# Patient Record
Sex: FEMALE | Race: WHITE | NOT HISPANIC OR LATINO | Employment: OTHER | URBAN - METROPOLITAN AREA
[De-identification: names, ages, dates, MRNs, and addresses within clinical notes are randomized per-mention and may not be internally consistent; named-entity substitution may affect disease eponyms.]

---

## 2017-04-12 ENCOUNTER — OFFICE VISIT (OUTPATIENT)
Dept: OBSTETRICS AND GYNECOLOGY | Facility: CLINIC | Age: 37
End: 2017-04-12
Payer: COMMERCIAL

## 2017-04-12 VITALS
DIASTOLIC BLOOD PRESSURE: 88 MMHG | SYSTOLIC BLOOD PRESSURE: 126 MMHG | WEIGHT: 128.31 LBS | BODY MASS INDEX: 22.73 KG/M2

## 2017-04-12 DIAGNOSIS — Z12.4 CERVICAL CANCER SCREENING: Primary | ICD-10-CM

## 2017-04-12 DIAGNOSIS — Z30.41 ENCOUNTER FOR SURVEILLANCE OF CONTRACEPTIVE PILLS: ICD-10-CM

## 2017-04-12 PROCEDURE — 99999 PR PBB SHADOW E&M-EST. PATIENT-LVL III: CPT | Mod: PBBFAC,,, | Performed by: OBSTETRICS & GYNECOLOGY

## 2017-04-12 PROCEDURE — 88175 CYTOPATH C/V AUTO FLUID REDO: CPT

## 2017-04-12 PROCEDURE — 99395 PREV VISIT EST AGE 18-39: CPT | Mod: S$GLB,,, | Performed by: OBSTETRICS & GYNECOLOGY

## 2017-04-12 RX ORDER — NORETHINDRONE ACETATE AND ETHINYL ESTRADIOL 1.5-30(21)
1 KIT ORAL DAILY
Qty: 28 TABLET | Refills: 12 | Status: SHIPPED | OUTPATIENT
Start: 2017-04-12 | End: 2018-05-09 | Stop reason: SDUPTHER

## 2017-04-12 NOTE — PROGRESS NOTES
Chief Complaint   Patient presents with    Well Woman    Medication Refill     OCP       History and Physical:  Patient's last menstrual period was 2017.       Kimberly Pearce is a 36 y.o.  female who presents today for her routine annual GYN exam. The patient has no Gynecology complaints today. No bowel or bladder complaints. Doing well on oral contraceptive pills , but planning on vasectomy- counseled. Recent breast implant removal.       Allergies: Review of patient's allergies indicates:  No Known Allergies    Past Medical History:   Diagnosis Date    Abnormal Pap smear     Abnormal Pap smear of vagina     Scoliosis     Uterine fibroid        Past Surgical History:   Procedure Laterality Date    Breast Lift      BREAST SURGERY       SECTION      COSMETIC SURGERY  2011    breast augmentation       MEDS:   Current Outpatient Prescriptions on File Prior to Visit   Medication Sig Dispense Refill    multivitamin capsule Take 1 capsule by mouth once daily.      norethindrone-ethinyl estradiol-iron (MICROGESTIN FE1.5/30) 1.5 mg-30 mcg (21)/75 mg (7) tablet Take 1 tablet by mouth once daily. 28 tablet 12     No current facility-administered medications on file prior to visit.        OB History      Para Term  AB TAB SAB Ectopic Multiple Living    1 1                  Social History     Social History    Marital status:      Spouse name: N/A    Number of children: N/A    Years of education: N/A     Occupational History    Not on file.     Social History Main Topics    Smoking status: Never Smoker    Smokeless tobacco: Not on file    Alcohol use 2.0 oz/week     4 Standard drinks or equivalent per week    Drug use: No    Sexual activity: Yes     Partners: Male     Birth control/ protection: OCP     Other Topics Concern    Not on file     Social History Narrative       Family History   Problem Relation Age of Onset    Diabetes Maternal Grandmother      Breast cancer Neg Hx     Ovarian cancer Neg Hx          Past medical and surgical history reviewed.   I have reviewed the patient's medical history in detail and updated the computerized patient record.        Review of System:   General: no chills, fever, night sweats, weight gain or weight loss  Psychological: no depression or suicidal ideation  Breasts: no new or changing breast lumps, nipple discharge or masses.  Respiratory: no cough, shortness of breath, or wheezing  Cardiovascular: no chest pain or dyspnea on exertion  Gastrointestinal: no abdominal pain, change in bowel habits, or black or bloody stools  Genito-Urinary: no incontinence, urinary frequency/urgency or vulvar/vaginal symptoms, pelvic pain or abnormal vaginal bleeding.  Musculoskeletal: no gait disturbance or muscular weakness      Physical Exam:   /88  Wt 58.2 kg (128 lb 4.9 oz)  LMP 03/22/2017  BMI 22.73 kg/m2  Constitutional: She is oriented to person, place, and time. She appears well-developed and well-nourished. No distress.   HENT:   Head: Normocephalic and atraumatic.   Eyes: Conjunctivae and EOM are normal. No scleral icterus.   Neck: Normal range of motion. Neck supple. No tracheal deviation present.   Cardiovascular: Normal rate.    Pulmonary/Chest: Effort normal. No respiratory distress. She exhibits no tenderness.  Breasts: are symmetrical. Healing scars, palpation deferred.   Abdominal: Soft. She exhibits no distension and no mass. There is no tenderness. There is no rebound and no guarding.   Genitourinary:    External rectal exam shows no thrombosed external hemorrhoids.    Pelvic exam was performed with patient supine.   No labial fusion.   There is no rash, lesion or injury on the right labia.   There is no rash, lesion or injury on the left labia.   No bleeding and no signs of injury around the vaginal introitus, urethra is without lesions and well supported. The cervix is visualized with no discharge, lesions or  friability.   No vaginal discharge found.    No significant Cystocele, Enterocele or rectocele, and uterus well supported.   Bimanual exam:   The urethra is normal to palpation and there are no palpable vaginal wall masses.   Uterus is not deviated, not enlarged, not fixed, normal shape and not tender.   Cervix exhibits no motion tenderness.    Right adnexum displays no mass and no tenderness.   Left adnexum displays no mass and no tenderness.  Musculoskeletal: Normal range of motion.   Lymphadenopathy: No inguinal adenopathy present.   Neurological: She is alert and oriented to person, place, and time. Coordination normal.   Skin: Skin is warm and dry. She is not diaphoretic.   Psychiatric: She has a normal mood and affect.      Assessment:   Normal annual GYN exam  1. Cervical cancer screening  Liquid-based pap smear, screening       Plan:   PAP  Follow up in 1 year.

## 2017-04-12 NOTE — MR AVS SNAPSHOT
[facility]  [address] [place] [id number]  Phone: [phone]                  Kimberly Pearce   2017 11:20 AM   Office Visit    Description:  Female : 1980   Provider:  Kristian Barnett MD   Department:  [facility]           Reason for Visit     Well Woman     Medication Refill           Diagnoses this Visit        Comments    Cervical cancer screening    -  Primary            To Do List           Goals (5 Years of Data)     None      [facility]     [facility] Care [facility] -  Assistance  Unless otherwise directed by your provider, please contact [facility], our nurse care line that is available for  assistance.     Registered nurses in the [facility] provide: appointment scheduling, clinical advisement, health education, and other advisory services.  Call: [phone] (toll free)               Medications           Message regarding Medications     Verify the changes and/or additions to your medication regime listed below are the same as discussed with your clinician today.  If any of these changes or additions are incorrect, please notify your healthcare provider.             Verify that the below list of medications is an accurate representation of the medications you are currently taking.  If none reported, the list may be blank. If incorrect, please contact your healthcare provider. Carry this list with you in case of emergency.           Current Medications     multivitamin capsule Take 1 capsule by mouth once daily.    norethindrone-ethinyl estradiol-iron (MICROGESTIN FE1.5/30) 1.5 mg-30 mcg (21)/75 mg (7) tablet Take 1 tablet by mouth once daily.           Clinical Reference Information           Your Vitals Were     BP Weight Last Period BMI       126/88 58.2 kg (128 lb 4.9 oz) 2017 22.73 kg/m2       Blood Pressure          Most Recent Value    BP  126/88      Allergies as of 2017     No Known  Allergies      Immunizations Administered on Date of Encounter - 4/12/2017     None      Orders Placed During Today's Visit      Normal Orders This Visit    Liquid-based pap smear, screening       Language Assistance Services     ATTENTION: Language assistance services are available, free of charge. Please call [phone].      ATENCIÓN: Si habneto mims, tiene a cruz disposición servicios gratuitos de asistencia lingüística. Llame al [phone].     CHÚ Ý: N?u b?n nói Ti?ng Vi?t, có các d?ch v? h? tr? ngôn ng? mi?n phí dành cho b?n. G?i s? [phone].         [facility] complies with applicable Federal civil rights laws and does not discriminate on the basis of race, color, national origin, age, disability, or sex.

## 2018-05-09 ENCOUNTER — OFFICE VISIT (OUTPATIENT)
Dept: OBSTETRICS AND GYNECOLOGY | Facility: CLINIC | Age: 38
End: 2018-05-09
Payer: COMMERCIAL

## 2018-05-09 VITALS
BODY MASS INDEX: 22.73 KG/M2 | HEIGHT: 63 IN | SYSTOLIC BLOOD PRESSURE: 126 MMHG | DIASTOLIC BLOOD PRESSURE: 84 MMHG | WEIGHT: 128.31 LBS

## 2018-05-09 DIAGNOSIS — Z30.41 ENCOUNTER FOR SURVEILLANCE OF CONTRACEPTIVE PILLS: ICD-10-CM

## 2018-05-09 DIAGNOSIS — Z12.4 PAP SMEAR FOR CERVICAL CANCER SCREENING: Primary | ICD-10-CM

## 2018-05-09 PROCEDURE — 99999 PR PBB SHADOW E&M-EST. PATIENT-LVL III: CPT | Mod: PBBFAC,,, | Performed by: OBSTETRICS & GYNECOLOGY

## 2018-05-09 PROCEDURE — 87624 HPV HI-RISK TYP POOLED RSLT: CPT

## 2018-05-09 PROCEDURE — 88175 CYTOPATH C/V AUTO FLUID REDO: CPT

## 2018-05-09 PROCEDURE — 99395 PREV VISIT EST AGE 18-39: CPT | Mod: S$GLB,,, | Performed by: OBSTETRICS & GYNECOLOGY

## 2018-05-09 RX ORDER — NORETHINDRONE ACETATE AND ETHINYL ESTRADIOL 1.5-30(21)
1 KIT ORAL DAILY
Qty: 90 TABLET | Refills: 3 | Status: SHIPPED | OUTPATIENT
Start: 2018-05-09 | End: 2019-03-28 | Stop reason: SDUPTHER

## 2018-05-09 RX ORDER — DAPSONE 75 MG/G
GEL TOPICAL DAILY
COMMUNITY
Start: 2018-04-24

## 2018-05-09 RX ORDER — SPIRONOLACTONE 50 MG/1
TABLET, FILM COATED ORAL
Refills: 0 | COMMUNITY
Start: 2018-04-24 | End: 2020-10-07 | Stop reason: SDUPTHER

## 2018-05-09 NOTE — PROGRESS NOTES
Chief Complaint   Patient presents with    Annual Exam       History and Physical:  Patient's last menstrual period was 2018 (approximate).       Kimberly Pearce is a 37 y.o.  female who presents today for her routine annual GYN exam. The patient has no Gynecology complaints today. No bowel or bladder complaints.       Allergies: Review of patient's allergies indicates:  No Known Allergies    Past Medical History:   Diagnosis Date    Abnormal Pap smear     Abnormal Pap smear of vagina     Scoliosis     Uterine fibroid        Past Surgical History:   Procedure Laterality Date    Breast Lift      BREAST SURGERY       SECTION      COSMETIC SURGERY  2011    breast augmentation       MEDS:   Current Outpatient Prescriptions on File Prior to Visit   Medication Sig Dispense Refill    multivitamin capsule Take 1 capsule by mouth once daily.      ondansetron (ZOFRAN-ODT) 4 MG TbDL Take 1 tablet (4 mg total) by mouth every 6 (six) hours as needed. 30 tablet 0    [DISCONTINUED] norethindrone-ethinyl estradiol-iron (MICROGESTIN FE1.5/30) 1.5 mg-30 mcg (21)/75 mg (7) tablet Take 1 tablet by mouth once daily. 28 tablet 12     No current facility-administered medications on file prior to visit.        OB History      Para Term  AB Living    1 1            SAB TAB Ectopic Multiple Live Births                       Social History     Social History    Marital status:      Spouse name: N/A    Number of children: N/A    Years of education: N/A     Occupational History    Not on file.     Social History Main Topics    Smoking status: Never Smoker    Smokeless tobacco: Never Used    Alcohol use 2.0 oz/week     4 Standard drinks or equivalent per week    Drug use: No    Sexual activity: Yes     Partners: Male     Birth control/ protection: OCP     Other Topics Concern    Not on file     Social History Narrative    No narrative on file       Family History   Problem  "Relation Age of Onset    Diabetes Maternal Grandmother     Breast cancer Neg Hx     Ovarian cancer Neg Hx          Past medical and surgical history reviewed.   I have reviewed the patient's medical history in detail and updated the computerized patient record.        Review of System:   General: no chills, fever, night sweats, weight gain or weight loss  Psychological: no depression or suicidal ideation  Breasts: no new or changing breast lumps, nipple discharge or masses.  Respiratory: no cough, shortness of breath, or wheezing  Cardiovascular: no chest pain or dyspnea on exertion  Gastrointestinal: no abdominal pain, change in bowel habits, or black or bloody stools  Genito-Urinary: no incontinence, urinary frequency/urgency or vulvar/vaginal symptoms, pelvic pain or abnormal vaginal bleeding.  Musculoskeletal: no gait disturbance or muscular weakness      Physical Exam:   /84   Ht 5' 3" (1.6 m)   Wt 58.2 kg (128 lb 4.9 oz)   LMP 04/18/2018 (Approximate)   BMI 22.73 kg/m²   Constitutional: She is oriented to person, place, and time. She appears well-developed and well-nourished. No distress.   HENT:   Head: Normocephalic and atraumatic.   Eyes: Conjunctivae and EOM are normal. No scleral icterus.   Neck: Normal range of motion. Neck supple. No tracheal deviation present.   Cardiovascular: Normal rate.    Pulmonary/Chest: Effort normal. No respiratory distress. She exhibits no tenderness.  Breasts: are symmetrical. Well healed scars   Right breast exhibits no inverted nipple, no mass, no nipple discharge, no skin change and no tenderness.   Left breast exhibits no inverted nipple, no mass, no nipple discharge, no skin change and no tenderness.  Abdominal: Soft. She exhibits no distension and no mass. There is no tenderness. There is no rebound and no guarding.   Genitourinary:    External rectal exam shows no thrombosed external hemorrhoids.    Pelvic exam was performed with patient supine.   No " labial fusion.   There is no rash, lesion or injury on the right labia.   There is no rash, lesion or injury on the left labia.   No bleeding and no signs of injury around the vaginal introitus, urethra is without lesions and well supported. The cervix is visualized with no discharge, lesions or friability.   No vaginal discharge found.   No significant Cystocele, Enterocele or rectocele, and uterus well supported.   Bimanual exam:   The urethra is normal to palpation and there are no palpable vaginal wall masses.   Uterus is not deviated, not enlarged, not fixed, normal shape and not tender.   Cervix exhibits no motion tenderness.    Right adnexum displays no mass and no tenderness.   Left adnexum displays no mass and no tenderness.  Musculoskeletal: Normal range of motion.   Lymphadenopathy: No inguinal adenopathy present.   Neurological: She is alert and oriented to person, place, and time. Coordination normal.   Skin: Skin is warm and dry. She is not diaphoretic.   Psychiatric: She has a normal mood and affect.      Assessment:   Normal annual GYN exam  1. Pap smear for cervical cancer screening  Liquid-based pap smear, screening    HPV High Risk Genotypes, PCR   2. Encounter for surveillance of contraceptive pills  norethindrone-ethinyl estradiol-iron (MICROGESTIN FE1.5/30) 1.5 mg-30 mcg (21)/75 mg (7) tablet       Plan:   PAP  Mammogram at 39  Refill oral contraceptive pills   Follow up in 1 year.  Patient informed will be contacted with results within 2 weeks. Encouraged to please call back or email if she has not heard from us by then.

## 2018-05-12 DIAGNOSIS — Z30.41 ENCOUNTER FOR SURVEILLANCE OF CONTRACEPTIVE PILLS: ICD-10-CM

## 2018-05-14 LAB
HPV16 AG SPEC QL: NEGATIVE
HPV16+18+H RISK 12 DNA CVX-IMP: NEGATIVE
HPV18 DNA SPEC QL NAA+PROBE: NEGATIVE

## 2018-05-14 RX ORDER — NORETHINDRONE ACETATE AND ETHINYL ESTRADIOL AND FERROUS FUMARATE 1.5-30(21)
KIT ORAL
Qty: 28 TABLET | Refills: 12 | Status: SHIPPED | OUTPATIENT
Start: 2018-05-14 | End: 2019-05-14 | Stop reason: SDUPTHER

## 2019-03-28 DIAGNOSIS — Z30.41 ENCOUNTER FOR SURVEILLANCE OF CONTRACEPTIVE PILLS: ICD-10-CM

## 2019-03-28 RX ORDER — NORETHINDRONE ACETATE AND ETHINYL ESTRADIOL 1.5-30(21)
KIT ORAL
Qty: 84 TABLET | Refills: 0 | Status: SHIPPED | OUTPATIENT
Start: 2019-03-28 | End: 2019-05-14

## 2019-05-14 ENCOUNTER — OFFICE VISIT (OUTPATIENT)
Dept: OBSTETRICS AND GYNECOLOGY | Facility: CLINIC | Age: 39
End: 2019-05-14
Payer: COMMERCIAL

## 2019-05-14 VITALS
SYSTOLIC BLOOD PRESSURE: 122 MMHG | WEIGHT: 126.56 LBS | HEIGHT: 63 IN | DIASTOLIC BLOOD PRESSURE: 76 MMHG | BODY MASS INDEX: 22.43 KG/M2

## 2019-05-14 DIAGNOSIS — Z30.41 ENCOUNTER FOR SURVEILLANCE OF CONTRACEPTIVE PILLS: ICD-10-CM

## 2019-05-14 DIAGNOSIS — Z12.4 PAP SMEAR FOR CERVICAL CANCER SCREENING: Primary | ICD-10-CM

## 2019-05-14 PROCEDURE — 88175 CYTOPATH C/V AUTO FLUID REDO: CPT

## 2019-05-14 PROCEDURE — 99395 PREV VISIT EST AGE 18-39: CPT | Mod: S$GLB,,, | Performed by: OBSTETRICS & GYNECOLOGY

## 2019-05-14 PROCEDURE — 99999 PR PBB SHADOW E&M-EST. PATIENT-LVL III: ICD-10-PCS | Mod: PBBFAC,,, | Performed by: OBSTETRICS & GYNECOLOGY

## 2019-05-14 PROCEDURE — 87624 HPV HI-RISK TYP POOLED RSLT: CPT

## 2019-05-14 PROCEDURE — 99999 PR PBB SHADOW E&M-EST. PATIENT-LVL III: CPT | Mod: PBBFAC,,, | Performed by: OBSTETRICS & GYNECOLOGY

## 2019-05-14 PROCEDURE — 99395 PR PREVENTIVE VISIT,EST,18-39: ICD-10-PCS | Mod: S$GLB,,, | Performed by: OBSTETRICS & GYNECOLOGY

## 2019-05-14 RX ORDER — NORETHINDRONE ACETATE AND ETHINYL ESTRADIOL 1.5-30(21)
1 KIT ORAL DAILY
Qty: 90 TABLET | Refills: 3 | Status: SHIPPED | OUTPATIENT
Start: 2019-05-14 | End: 2020-05-12 | Stop reason: SDUPTHER

## 2019-05-14 NOTE — PROGRESS NOTES
Chief Complaint   Patient presents with    Well Woman       History and Physical:  Patient's last menstrual period was 2019.       Kimberly Pearce is a 38 y.o.  female who presents today for her routine annual GYN exam. The patient has no Gynecology complaints today. Mammogram due next year, counseled.       Allergies: Review of patient's allergies indicates:  No Known Allergies    Past Medical History:   Diagnosis Date    Abnormal Pap smear     Abnormal Pap smear of vagina     Scoliosis     Uterine fibroid        Past Surgical History:   Procedure Laterality Date    Breast Lift      BREAST SURGERY       SECTION      COSMETIC SURGERY  2011    breast augmentation       MEDS:   Current Outpatient Medications on File Prior to Visit   Medication Sig Dispense Refill    ACZONE 7.5 % GlwP       MICROGESTIN FE 1.5/30, 28, 1.5 mg-30 mcg (21)/75 mg (7) tablet TAKE 1 TABLET BY MOUTH EVERY DAY 28 tablet 12    multivitamin capsule Take 1 capsule by mouth once daily.      spironolactone (ALDACTONE) 50 MG tablet TK ONE T PO D  0    [DISCONTINUED] BLISOVI FE 1.5/30, 28, 1.5 mg-30 mcg (21)/75 mg (7) tablet TAKE 1 TABLET BY MOUTH EVERY DAY 84 tablet 0    [DISCONTINUED] ondansetron (ZOFRAN-ODT) 4 MG TbDL Take 1 tablet (4 mg total) by mouth every 6 (six) hours as needed. 30 tablet 0     No current facility-administered medications on file prior to visit.        OB History        1    Para   1    Term                AB        Living           SAB        TAB        Ectopic        Multiple        Live Births                     Social History     Socioeconomic History    Marital status:      Spouse name: Not on file    Number of children: Not on file    Years of education: Not on file    Highest education level: Not on file   Occupational History    Not on file   Social Needs    Financial resource strain: Not on file    Food insecurity:     Worry: Not on file      "Inability: Not on file    Transportation needs:     Medical: Not on file     Non-medical: Not on file   Tobacco Use    Smoking status: Never Smoker    Smokeless tobacco: Never Used   Substance and Sexual Activity    Alcohol use: Yes     Alcohol/week: 2.0 oz     Types: 4 Standard drinks or equivalent per week    Drug use: No    Sexual activity: Yes     Partners: Male     Birth control/protection: OCP   Lifestyle    Physical activity:     Days per week: Not on file     Minutes per session: Not on file    Stress: Not on file   Relationships    Social connections:     Talks on phone: Not on file     Gets together: Not on file     Attends [employer] service: Not on file     Active member of club or organization: Not on file     Attends meetings of clubs or organizations: Not on file     Relationship status: Not on file   Other Topics Concern    Not on file   Social History Narrative    Not on file       Family History   Problem Relation Age of Onset    Diabetes Maternal Grandmother     Breast cancer Neg Hx     Ovarian cancer Neg Hx          Past medical and surgical history reviewed.   I have reviewed the patient's medical history in detail and updated the computerized patient record.        Review of System:   General: no chills, fever, night sweats, weight gain or weight loss  Psychological: no depression or suicidal ideation  Breasts: no new or changing breast lumps, nipple discharge or masses.  Respiratory: no cough, shortness of breath, or wheezing  Cardiovascular: no chest pain or dyspnea on exertion  Gastrointestinal: no abdominal pain, change in bowel habits, or black or bloody stools  Genito-Urinary: no incontinence, urinary frequency/urgency or vulvar/vaginal symptoms, pelvic pain or abnormal vaginal bleeding.  Musculoskeletal: no gait disturbance or muscular weakness      Physical Exam:   /76   Ht 5' 3" (1.6 m)   Wt 57.4 kg (126 lb 8.7 oz)   LMP 04/30/2019   BMI 22.42 kg/m² "   Constitutional: She appears alert and responsive. She appears well-developed, well-groomed, and well-nourished. No distress. Normal Weight   HENT:   Head: Normocephalic and atraumatic.   Eyes: Conjunctivae and EOM are normal. No scleral icterus.   Neck: Symmetrical. Normal range of motion. Neck supple. No tracheal deviation present. THYROID: without masses or tenderness.  Cardiovascular: Normal rate, no rhythm abnormality noted. Extremities without swelling or edema, warm.    Pulmonary/Chest: Normal respiratory Effort. No distress or retractions. She exhibits no tenderness.  Breasts: are symmetrical. Well healed reduction / lift scars   Right breast exhibits no inverted nipple, no mass, no nipple discharge, no skin change and no tenderness.   Left breast exhibits no inverted nipple, no mass, no nipple discharge, no skin change and no tenderness.  Abdominal: Soft. She exhibits no distension, hernias or masses. There is no tenderness. No enlargement of liver edge or spleen.  There is no rebound and no guarding.   Genitourinary:    External rectal exam shows no thrombosed external hemorrhoids, no lesions.     Pelvic exam was performed with patient supine.   No labial fusion, and symmetrical.    There is no rash, lesion or injury on the right labia.   There is no rash, lesion or injury on the left labia.   No bleeding and no signs of injury around the vaginal introitus, urethral meatus is normal size and without prolapse or lesions, urethra well supported. The cervix is visualized with no discharge, lesions or friability.   No vaginal discharge found.   No significant Cystocele, Enterocele or rectocele, and cervix and uterus well supported.   Bimanual exam:   The urethra is normal to palpation and there are no palpable vaginal wall masses.   Uterus is not deviated, not enlarged, not fixed, normal shape, retroverted, and not tender.   Cervix exhibits no motion tenderness.    Right adnexum displays no mass or nodularity  and no tenderness.   Left adnexum displays no mass or nodularity and no tenderness.  Musculoskeletal: Normal range of motion.   Lymphadenopathy: No inguinal adenopathy present.   Neurological: She is alert and oriented to person, place, and time. Coordination normal.   Skin: Skin is warm and dry. She is not diaphoretic. No rashes, lesions or ulcers.   Psychiatric: She has a normal mood and affect, oriented to person, place, and time.      Assessment:   Normal annual GYN exam  1. Pap smear for cervical cancer screening  Liquid-based pap smear, screening    HPV High Risk Genotypes, PCR       Plan:   PAP  Mammogram next year  Follow up in 1 year.  Patient informed will be contacted with results within 2 weeks. Encouraged to please call back or email if she has not heard from us by then.

## 2019-05-15 ENCOUNTER — PATIENT MESSAGE (OUTPATIENT)
Dept: OBSTETRICS AND GYNECOLOGY | Facility: CLINIC | Age: 39
End: 2019-05-15

## 2019-05-17 LAB
HPV HR 12 DNA CVX QL NAA+PROBE: NEGATIVE
HPV16 AG SPEC QL: NEGATIVE
HPV18 DNA SPEC QL NAA+PROBE: NEGATIVE

## 2020-05-12 DIAGNOSIS — Z30.41 ENCOUNTER FOR SURVEILLANCE OF CONTRACEPTIVE PILLS: ICD-10-CM

## 2020-05-12 RX ORDER — NORETHINDRONE ACETATE AND ETHINYL ESTRADIOL 1.5-30(21)
1 KIT ORAL DAILY
Qty: 30 TABLET | Refills: 0 | Status: SHIPPED | OUTPATIENT
Start: 2020-05-12 | End: 2020-06-04 | Stop reason: SDUPTHER

## 2020-06-04 ENCOUNTER — OFFICE VISIT (OUTPATIENT)
Dept: OBSTETRICS AND GYNECOLOGY | Facility: CLINIC | Age: 40
End: 2020-06-04
Payer: COMMERCIAL

## 2020-06-04 ENCOUNTER — HOSPITAL ENCOUNTER (OUTPATIENT)
Dept: RADIOLOGY | Facility: HOSPITAL | Age: 40
Discharge: HOME | End: 2020-06-04
Attending: OBSTETRICS & GYNECOLOGY
Payer: COMMERCIAL

## 2020-06-04 VITALS
WEIGHT: 131.38 LBS | DIASTOLIC BLOOD PRESSURE: 90 MMHG | RESPIRATION RATE: 20 BRPM | SYSTOLIC BLOOD PRESSURE: 130 MMHG | BODY MASS INDEX: 23.28 KG/M2

## 2020-06-04 DIAGNOSIS — Z12.31 VISIT FOR SCREENING MAMMOGRAM: ICD-10-CM

## 2020-06-04 DIAGNOSIS — Z30.430 ENCOUNTER FOR INSERTION OF INTRAUTERINE CONTRACEPTIVE DEVICE (IUD): ICD-10-CM

## 2020-06-04 DIAGNOSIS — Z30.41 ENCOUNTER FOR SURVEILLANCE OF CONTRACEPTIVE PILLS: ICD-10-CM

## 2020-06-04 DIAGNOSIS — Z01.419 CERVICAL SMEAR, AS PART OF ROUTINE GYNECOLOGICAL EXAMINATION: Primary | ICD-10-CM

## 2020-06-04 PROCEDURE — 88175 CYTOPATH C/V AUTO FLUID REDO: CPT

## 2020-06-04 PROCEDURE — 77063 BREAST TOMOSYNTHESIS BI: CPT | Mod: 26,,, | Performed by: RADIOLOGY

## 2020-06-04 PROCEDURE — 77067 SCR MAMMO BI INCL CAD: CPT | Mod: TC,PN

## 2020-06-04 PROCEDURE — 77067 SCR MAMMO BI INCL CAD: CPT | Mod: 26,,, | Performed by: RADIOLOGY

## 2020-06-04 PROCEDURE — 99999 PR PBB SHADOW E&M-EST. PATIENT-LVL III: CPT | Mod: PBBFAC,,, | Performed by: OBSTETRICS & GYNECOLOGY

## 2020-06-04 PROCEDURE — 99396 PREV VISIT EST AGE 40-64: CPT | Mod: S$GLB,,, | Performed by: OBSTETRICS & GYNECOLOGY

## 2020-06-04 PROCEDURE — 77063 MAMMO DIGITAL SCREENING BILAT WITH TOMOSYNTHESIS_CAD: ICD-10-PCS | Mod: 26,,, | Performed by: RADIOLOGY

## 2020-06-04 PROCEDURE — 77067 MAMMO DIGITAL SCREENING BILAT WITH TOMOSYNTHESIS_CAD: ICD-10-PCS | Mod: 26,,, | Performed by: RADIOLOGY

## 2020-06-04 PROCEDURE — 99396 PR PREVENTIVE VISIT,EST,40-64: ICD-10-PCS | Mod: S$GLB,,, | Performed by: OBSTETRICS & GYNECOLOGY

## 2020-06-04 PROCEDURE — 99999 PR PBB SHADOW E&M-EST. PATIENT-LVL III: ICD-10-PCS | Mod: PBBFAC,,, | Performed by: OBSTETRICS & GYNECOLOGY

## 2020-06-04 RX ORDER — NORETHINDRONE ACETATE AND ETHINYL ESTRADIOL 1.5-30(21)
1 KIT ORAL DAILY
Qty: 30 TABLET | Refills: 1 | Status: SHIPPED | OUTPATIENT
Start: 2020-06-04 | End: 2020-07-31

## 2020-06-04 NOTE — PROGRESS NOTES
Chief Complaint   Patient presents with    Well Woman       History and Physical:  Patient's last menstrual period was 2020.       Kimberly Pearce is a 39 y.o.  female who presents today for her routine annual GYN exam. The patient has no Gynecology complaints today. Menses lasting 7 days per month, heavy on oral contraceptive pills - counseled, interested in mirena for treatment and contraception.        Allergies: Review of patient's allergies indicates:  No Known Allergies    Past Medical History:   Diagnosis Date    Abnormal Pap smear     Abnormal Pap smear of vagina     Scoliosis     Uterine fibroid        Past Surgical History:   Procedure Laterality Date    Breast Lift      BREAST RECONSTRUCTION      BREAST SURGERY       SECTION      COSMETIC SURGERY  2011    breast augmentation       MEDS:   Current Outpatient Medications on File Prior to Visit   Medication Sig Dispense Refill    ACZONE 7.5 % GlwP       multivitamin capsule Take 1 capsule by mouth once daily.      spironolactone (ALDACTONE) 50 MG tablet TK ONE T PO D  0    [DISCONTINUED] norethindrone-ethinyl estradiol-iron (MICROGESTIN FE 1.5/30, 28,) 1.5 mg-30 mcg (21)/75 mg (7) tablet Take 1 tablet by mouth once daily. 30 tablet 0     No current facility-administered medications on file prior to visit.        OB History        2    Para   2    Term   1            AB        Living           SAB        TAB        Ectopic        Multiple        Live Births                     Social History     Socioeconomic History    Marital status:      Spouse name: Not on file    Number of children: Not on file    Years of education: Not on file    Highest education level: Not on file   Occupational History    Not on file   Social Needs    Financial resource strain: Not on file    Food insecurity:     Worry: Not on file     Inability: Not on file    Transportation needs:     Medical: Not on file      Non-medical: Not on file   Tobacco Use    Smoking status: Never Smoker    Smokeless tobacco: Never Used   Substance and Sexual Activity    Alcohol use: Yes     Alcohol/week: 3.3 standard drinks     Types: 4 Standard drinks or equivalent per week    Drug use: No    Sexual activity: Yes     Partners: Male     Birth control/protection: OCP   Lifestyle    Physical activity:     Days per week: Not on file     Minutes per session: Not on file    Stress: Not on file   Relationships    Social connections:     Talks on phone: Not on file     Gets together: Not on file     Attends Hinduism service: Not on file     Active member of club or organization: Not on file     Attends meetings of clubs or organizations: Not on file     Relationship status: Not on file   Other Topics Concern    Not on file   Social History Narrative    Not on file       Family History   Problem Relation Age of Onset    Diabetes Maternal Grandmother     Breast cancer Neg Hx     Ovarian cancer Neg Hx          Past medical and surgical history reviewed.   I have reviewed the patient's medical history in detail and updated the computerized patient record.        Review of System:   General: no chills, fever, night sweats, weight gain or weight loss  Psychological: no depression or suicidal ideation  Breasts: no new or changing breast lumps, nipple discharge or masses.  Respiratory: no cough, shortness of breath, or wheezing  Cardiovascular: no chest pain or dyspnea on exertion  Gastrointestinal: no abdominal pain, change in bowel habits, or black or bloody stools  Genito-Urinary: no incontinence, urinary frequency/urgency or vulvar/vaginal symptoms, pelvic pain or abnormal vaginal bleeding.  Musculoskeletal: no gait disturbance or muscular weakness      Physical Exam:   BP (!) 130/90   Resp 20   Wt 59.6 kg (131 lb 6.3 oz)   LMP 05/14/2020   BMI 23.28 kg/m²   Constitutional: She appears alert and responsive. She appears well-developed,  well-groomed, and well-nourished. No distress. Thin  HENT:   Head: Normocephalic and atraumatic.   Eyes: Conjunctivae and EOM are normal. No scleral icterus.   Neck: Symmetrical. Normal range of motion. Neck supple. No tracheal deviation present. THYROID: without masses or tenderness.  Cardiovascular: Normal rate, no rhythm abnormality noted. Extremities without swelling or edema, warm.    Pulmonary/Chest: Normal respiratory Effort. No distress or retractions. She exhibits no tenderness.  Breasts: are symmetrical.   Right breast exhibits no inverted nipple, no mass, no nipple discharge, no skin change and no tenderness.   Left breast exhibits no inverted nipple, no mass, no nipple discharge, no skin change and no tenderness.  Abdominal: Soft. She exhibits no distension, hernias or masses. There is no tenderness. No enlargement of liver edge or spleen.  There is no rebound and no guarding.   Genitourinary:    External rectal exam shows no thrombosed external hemorrhoids, no lesions.     Pelvic exam was performed with patient supine.   No labial fusion, and symmetrical.    There is no rash, lesion or injury on the right labia.   There is no rash, lesion or injury on the left labia.   No bleeding and no signs of injury around the vaginal introitus, urethral meatus is normal size and without prolapse or lesions, urethra well supported. The cervix is visualized with no discharge, lesions or friability.   No vaginal discharge found.   No significant Cystocele, Enterocele or rectocele, and cervix and uterus well supported.   Bimanual exam:   The urethra is normal to palpation and there are no palpable vaginal wall masses.   Uterus is not deviated, not enlarged, not fixed, normal shape and not tender.   Cervix exhibits no motion tenderness.    Right adnexum displays no mass or nodularity and no tenderness.   Left adnexum displays no mass or nodularity and no tenderness.  Musculoskeletal: Normal range of motion.    Lymphadenopathy: No inguinal adenopathy present.   Neurological: She is alert and oriented to person, place, and time. Coordination normal.   Skin: Skin is warm and dry. She is not diaphoretic. No rashes, lesions or ulcers.   Psychiatric: She has a normal mood and affect, oriented to person, place, and time.      Assessment:   Normal annual GYN exam  1. Cervical smear, as part of routine gynecological examination  Liquid-Based Pap Smear, Screening   2. Visit for screening mammogram  Mammo Digital Screening Bilat w/ Daniel   3. Encounter for surveillance of contraceptive pills  norethindrone-ethinyl estradiol-iron (MICROGESTIN FE 1.5/30, 28,) 1.5 mg-30 mcg (21)/75 mg (7) tablet   menorrhagia on oral contraceptive pills     Plan:   PAP  Mammogram  Schedule lyletta or mirena  Follow up in 1 year.  Patient informed will be contacted with results within 2 weeks. Encouraged to please call back or email if she has not heard from us by then.

## 2020-06-11 LAB
FINAL PATHOLOGIC DIAGNOSIS: NORMAL
Lab: NORMAL

## 2020-07-13 ENCOUNTER — OFFICE VISIT (OUTPATIENT)
Dept: OBSTETRICS AND GYNECOLOGY | Facility: CLINIC | Age: 40
End: 2020-07-13
Payer: COMMERCIAL

## 2020-07-13 VITALS — DIASTOLIC BLOOD PRESSURE: 84 MMHG | SYSTOLIC BLOOD PRESSURE: 142 MMHG

## 2020-07-13 DIAGNOSIS — Z30.430 ENCOUNTER FOR IUD INSERTION: Primary | ICD-10-CM

## 2020-07-13 LAB
B-HCG UR QL: NEGATIVE
CTP QC/QA: YES

## 2020-07-13 PROCEDURE — 99999 PR PBB SHADOW E&M-EST. PATIENT-LVL III: CPT | Mod: PBBFAC,,, | Performed by: OBSTETRICS & GYNECOLOGY

## 2020-07-13 PROCEDURE — 99499 NO LOS: ICD-10-PCS | Mod: S$GLB,,, | Performed by: OBSTETRICS & GYNECOLOGY

## 2020-07-13 PROCEDURE — 58300 INSERT INTRAUTERINE DEVICE: CPT | Mod: S$GLB,,, | Performed by: OBSTETRICS & GYNECOLOGY

## 2020-07-13 PROCEDURE — 99499 UNLISTED E&M SERVICE: CPT | Mod: S$GLB,,, | Performed by: OBSTETRICS & GYNECOLOGY

## 2020-07-13 PROCEDURE — 58300 PR INSERT INTRAUTERINE DEVICE: ICD-10-PCS | Mod: S$GLB,,, | Performed by: OBSTETRICS & GYNECOLOGY

## 2020-07-13 PROCEDURE — 99999 PR PBB SHADOW E&M-EST. PATIENT-LVL III: ICD-10-PCS | Mod: PBBFAC,,, | Performed by: OBSTETRICS & GYNECOLOGY

## 2020-07-13 RX ORDER — AZELASTINE 1 MG/ML
SPRAY, METERED NASAL
COMMUNITY
Start: 2020-06-14

## 2020-07-13 NOTE — PROGRESS NOTES
Intrauterine device Placement:  7/13/2020      PRE-IUD PLACEMENT COUNSELING:  All contraceptive options were reviewed and the patient chooses an IUD.  The patient's history was reviewed and there are no contraindications to an IUD. The procedure and minimal risks of pain, bleeding, perforation and infection at the insertion and spontaneous expulsion within the first two weeks was discussed. The benefits of amenorrhea and no systemic side effects were explained. All questions were answered and the patient agrees to proceed. Consent was signed (scanned into computer).    EXAM:  Uterine Position: antiverted    PROCEDURE:  TIME OUT PERFORMED.  The cervix visualized with a speculum.  A single tooth tenaculum was not placed on the anterior lip.  The uterus sounded to 8cm using sterile technique.  A Mirena IUD (lot#[id number])  was loaded and placed high in uterine fundus without difficulty using sterile technique.  The string was cut to 2cm length from exo cervix.   All instruments were removed from the cervix and vagina and the procedure was tolerated well.     ASSESSMENT:  1. Contraception management / IUD insertion.V25.0.    POST IUD PLACEMENT COUNSELING:  Manage post IUD placement pain with NSAIDs, Tylenol or Rx per Med[employer].  IUD danger signs and how to check the strings.  Removal in 5 years for Mirena IUD and in 10 years for Copper IUD.    Counseling lasted approximately 15 minutes and all her questions were answered.    FOLLOW-UP: With me in four weeks.

## 2020-07-21 ENCOUNTER — TELEPHONE (OUTPATIENT)
Dept: OBSTETRICS AND GYNECOLOGY | Facility: CLINIC | Age: 40
End: 2020-07-21

## 2020-07-21 NOTE — TELEPHONE ENCOUNTER
Fax from [facility] requesting insurance information for IUD request . Information faxed  
none

## 2020-08-11 ENCOUNTER — OFFICE VISIT (OUTPATIENT)
Dept: OBSTETRICS AND GYNECOLOGY | Facility: CLINIC | Age: 40
End: 2020-08-11
Payer: COMMERCIAL

## 2020-08-11 VITALS
BODY MASS INDEX: 23.35 KG/M2 | SYSTOLIC BLOOD PRESSURE: 118 MMHG | WEIGHT: 131.81 LBS | RESPIRATION RATE: 16 BRPM | DIASTOLIC BLOOD PRESSURE: 80 MMHG

## 2020-08-11 DIAGNOSIS — Z30.431 ENCOUNTER FOR ROUTINE CHECKING OF INTRAUTERINE CONTRACEPTIVE DEVICE (IUD): Primary | ICD-10-CM

## 2020-08-11 PROCEDURE — 99999 PR PBB SHADOW E&M-EST. PATIENT-LVL III: ICD-10-PCS | Mod: PBBFAC,,, | Performed by: OBSTETRICS & GYNECOLOGY

## 2020-08-11 PROCEDURE — 3008F PR BODY MASS INDEX (BMI) DOCUMENTED: ICD-10-PCS | Mod: CPTII,S$GLB,, | Performed by: OBSTETRICS & GYNECOLOGY

## 2020-08-11 PROCEDURE — 3008F BODY MASS INDEX DOCD: CPT | Mod: CPTII,S$GLB,, | Performed by: OBSTETRICS & GYNECOLOGY

## 2020-08-11 PROCEDURE — 99213 OFFICE O/P EST LOW 20 MIN: CPT | Mod: S$GLB,,, | Performed by: OBSTETRICS & GYNECOLOGY

## 2020-08-11 PROCEDURE — 99999 PR PBB SHADOW E&M-EST. PATIENT-LVL III: CPT | Mod: PBBFAC,,, | Performed by: OBSTETRICS & GYNECOLOGY

## 2020-08-11 PROCEDURE — 99213 PR OFFICE/OUTPT VISIT, EST, LEVL III, 20-29 MIN: ICD-10-PCS | Mod: S$GLB,,, | Performed by: OBSTETRICS & GYNECOLOGY

## 2020-08-11 NOTE — PROGRESS NOTES
History of Present Illness:   Pateint presents today 1 month status post Intrauterine Device  Insertion without complaint. .    Pathology: none    Physical exam:  /80   Resp 16   Wt 59.8 kg (131 lb 13.4 oz)   LMP 07/07/2020   BMI 23.35 kg/m²   Constitutional: She is oriented to person, place, and time. She appears well-developed and well-nourished. No distress.   HENT:   Head: Normocephalic and atraumatic.   Eyes: Conjunctivae and EOM are normal. No scleral icterus.   Neck: Normal range of motion. Neck supple. No tracheal deviation present.   Cardiovascular: Normal rate.    Pulmonary/Chest: Effort normal. No respiratory distress. She exhibits no tenderness.  Breasts: deferred  Abdominal: Soft. She exhibits no distension and no mass. There is no rebound and no guarding.   Genitourinary:     External rectal exam shows no thrombosed external hemorrhoids.    Pelvic exam was performed with patient supine.   There is no rash, lesion or injury on the right labia.   There is no rash, lesion or injury on the left labia.   No bleeding and no signs of injury around the vaginal introitus, urethra is without lesions and well supported. The cervix is visualized with no discharge, lesions or friability. Intrauterine Device string easily visualized.    No vaginal discharge found.    No significant Cystocele, Enterocele or rectocele, and uterus well supported.   Bimanual exam:   The urethra is normal to palpation and there are no palpable vaginal wall masses.   Uterus is not deviated, not enlarged, not fixed, normal shape and not tender.   Cervix exhibits no motion tenderness.    Right adnexum displays no mass and no tenderness.   Left adnexum displays no mass and no tenderness.  Neurological: She is alert and oriented to person, place, and time. Coordination normal.   Skin: Skin is warm and dry. She is not diaphoretic.   Psychiatric: She has a normal mood and affect.    Assessment:  Doing well with IUD.    Plan:  Resume  normal activity and follow up as scheduled for routine screening.

## 2020-10-07 ENCOUNTER — PATIENT MESSAGE (OUTPATIENT)
Dept: OBSTETRICS AND GYNECOLOGY | Facility: CLINIC | Age: 40
End: 2020-10-07

## 2020-10-07 RX ORDER — SPIRONOLACTONE 50 MG/1
50 TABLET, FILM COATED ORAL DAILY
Qty: 30 TABLET | Refills: 1 | Status: SHIPPED | OUTPATIENT
Start: 2020-10-07 | End: 2020-11-16

## 2020-10-07 NOTE — TELEPHONE ENCOUNTER
Patient is cramping and bleeding off an on since IUD was placed and having water retention and weight gain.  Please advise.

## 2020-10-08 ENCOUNTER — PATIENT MESSAGE (OUTPATIENT)
Dept: OBSTETRICS AND GYNECOLOGY | Facility: CLINIC | Age: 40
End: 2020-10-08

## 2020-10-13 ENCOUNTER — PATIENT MESSAGE (OUTPATIENT)
Dept: OBSTETRICS AND GYNECOLOGY | Facility: CLINIC | Age: 40
End: 2020-10-13

## 2020-11-16 ENCOUNTER — OFFICE VISIT (OUTPATIENT)
Dept: OBSTETRICS AND GYNECOLOGY | Facility: CLINIC | Age: 40
End: 2020-11-16
Payer: COMMERCIAL

## 2020-11-16 VITALS
DIASTOLIC BLOOD PRESSURE: 70 MMHG | WEIGHT: 132.06 LBS | SYSTOLIC BLOOD PRESSURE: 110 MMHG | RESPIRATION RATE: 20 BRPM | BODY MASS INDEX: 23.39 KG/M2

## 2020-11-16 DIAGNOSIS — N92.1 BREAKTHROUGH BLEEDING WITH IUD: Primary | ICD-10-CM

## 2020-11-16 DIAGNOSIS — Z97.5 BREAKTHROUGH BLEEDING WITH IUD: Primary | ICD-10-CM

## 2020-11-16 PROCEDURE — 99999 PR PBB SHADOW E&M-EST. PATIENT-LVL III: ICD-10-PCS | Mod: PBBFAC,,, | Performed by: OBSTETRICS & GYNECOLOGY

## 2020-11-16 PROCEDURE — 99999 PR PBB SHADOW E&M-EST. PATIENT-LVL III: CPT | Mod: PBBFAC,,, | Performed by: OBSTETRICS & GYNECOLOGY

## 2020-11-16 PROCEDURE — 3008F BODY MASS INDEX DOCD: CPT | Mod: CPTII,S$GLB,, | Performed by: OBSTETRICS & GYNECOLOGY

## 2020-11-16 PROCEDURE — 1126F PR PAIN SEVERITY QUANTIFIED, NO PAIN PRESENT: ICD-10-PCS | Mod: S$GLB,,, | Performed by: OBSTETRICS & GYNECOLOGY

## 2020-11-16 PROCEDURE — 99212 PR OFFICE/OUTPT VISIT, EST, LEVL II, 10-19 MIN: ICD-10-PCS | Mod: S$GLB,,, | Performed by: OBSTETRICS & GYNECOLOGY

## 2020-11-16 PROCEDURE — 3008F PR BODY MASS INDEX (BMI) DOCUMENTED: ICD-10-PCS | Mod: CPTII,S$GLB,, | Performed by: OBSTETRICS & GYNECOLOGY

## 2020-11-16 PROCEDURE — 99212 OFFICE O/P EST SF 10 MIN: CPT | Mod: S$GLB,,, | Performed by: OBSTETRICS & GYNECOLOGY

## 2020-11-16 PROCEDURE — 1126F AMNT PAIN NOTED NONE PRSNT: CPT | Mod: S$GLB,,, | Performed by: OBSTETRICS & GYNECOLOGY

## 2020-11-16 RX ORDER — ESTRADIOL 2 MG/1
2 TABLET ORAL DAILY
Qty: 30 TABLET | Refills: 0 | Status: SHIPPED | OUTPATIENT
Start: 2020-11-16 | End: 2022-07-12

## 2020-11-16 RX ORDER — SPIRONOLACTONE 25 MG/1
25 TABLET ORAL 2 TIMES DAILY PRN
Qty: 30 TABLET | Refills: 3 | Status: SHIPPED | OUTPATIENT
Start: 2020-11-16 | End: 2022-09-06 | Stop reason: SDUPTHER

## 2020-11-16 NOTE — PROGRESS NOTES
Continuous irregular bleeding with the mirena, counseled.     Not ready for ablation yet.    als having some fluid retention, counseled.     Plan:  Estradiol 2gm qd  Aldactone 25mg BID as needed   Follow up 1 month, if not improvined, plan endometrial ablation

## 2021-03-30 ENCOUNTER — IMMUNIZATION (OUTPATIENT)
Dept: FAMILY MEDICINE | Facility: CLINIC | Age: 41
End: 2021-03-30
Payer: COMMERCIAL

## 2021-03-30 DIAGNOSIS — Z23 NEED FOR VACCINATION: Primary | ICD-10-CM

## 2021-03-30 PROCEDURE — 91300 COVID-19, MRNA, LNP-S, PF, 30 MCG/0.3 ML DOSE VACCINE: CPT | Mod: PBBFAC | Performed by: FAMILY MEDICINE

## 2021-04-20 ENCOUNTER — IMMUNIZATION (OUTPATIENT)
Dept: FAMILY MEDICINE | Facility: CLINIC | Age: 41
End: 2021-04-20
Payer: COMMERCIAL

## 2021-04-20 DIAGNOSIS — Z23 NEED FOR VACCINATION: Primary | ICD-10-CM

## 2021-04-20 PROCEDURE — 91300 COVID-19, MRNA, LNP-S, PF, 30 MCG/0.3 ML DOSE VACCINE: CPT | Mod: PBBFAC | Performed by: FAMILY MEDICINE

## 2021-04-20 PROCEDURE — 0002A COVID-19, MRNA, LNP-S, PF, 30 MCG/0.3 ML DOSE VACCINE: CPT | Mod: PBBFAC | Performed by: FAMILY MEDICINE

## 2021-06-29 ENCOUNTER — OFFICE VISIT (OUTPATIENT)
Dept: OBSTETRICS AND GYNECOLOGY | Facility: CLINIC | Age: 41
End: 2021-06-29
Payer: COMMERCIAL

## 2021-06-29 ENCOUNTER — HOSPITAL ENCOUNTER (OUTPATIENT)
Dept: RADIOLOGY | Facility: HOSPITAL | Age: 41
Discharge: HOME | End: 2021-06-29
Attending: OBSTETRICS & GYNECOLOGY
Payer: COMMERCIAL

## 2021-06-29 VITALS
RESPIRATION RATE: 18 BRPM | BODY MASS INDEX: 22.38 KG/M2 | WEIGHT: 126.31 LBS | HEIGHT: 63 IN | DIASTOLIC BLOOD PRESSURE: 84 MMHG | SYSTOLIC BLOOD PRESSURE: 144 MMHG

## 2021-06-29 DIAGNOSIS — Z12.31 SCREENING MAMMOGRAM, ENCOUNTER FOR: ICD-10-CM

## 2021-06-29 DIAGNOSIS — Z01.419 ENCOUNTER FOR WELL WOMAN EXAM WITH ROUTINE GYNECOLOGICAL EXAM: Primary | ICD-10-CM

## 2021-06-29 PROCEDURE — 99396 PREV VISIT EST AGE 40-64: CPT | Mod: S$GLB,,, | Performed by: OBSTETRICS & GYNECOLOGY

## 2021-06-29 PROCEDURE — 77067 MAMMO DIGITAL SCREENING BILAT WITH TOMO: ICD-10-PCS | Mod: 26,,, | Performed by: RADIOLOGY

## 2021-06-29 PROCEDURE — 99999 PR PBB SHADOW E&M-EST. PATIENT-LVL III: CPT | Mod: PBBFAC,,, | Performed by: OBSTETRICS & GYNECOLOGY

## 2021-06-29 PROCEDURE — 77067 SCR MAMMO BI INCL CAD: CPT | Mod: 26,,, | Performed by: RADIOLOGY

## 2021-06-29 PROCEDURE — 1126F PR PAIN SEVERITY QUANTIFIED, NO PAIN PRESENT: ICD-10-PCS | Mod: S$GLB,,, | Performed by: OBSTETRICS & GYNECOLOGY

## 2021-06-29 PROCEDURE — 88175 CYTOPATH C/V AUTO FLUID REDO: CPT | Performed by: OBSTETRICS & GYNECOLOGY

## 2021-06-29 PROCEDURE — 3008F PR BODY MASS INDEX (BMI) DOCUMENTED: ICD-10-PCS | Mod: CPTII,S$GLB,, | Performed by: OBSTETRICS & GYNECOLOGY

## 2021-06-29 PROCEDURE — 99999 PR PBB SHADOW E&M-EST. PATIENT-LVL III: ICD-10-PCS | Mod: PBBFAC,,, | Performed by: OBSTETRICS & GYNECOLOGY

## 2021-06-29 PROCEDURE — 99396 PR PREVENTIVE VISIT,EST,40-64: ICD-10-PCS | Mod: S$GLB,,, | Performed by: OBSTETRICS & GYNECOLOGY

## 2021-06-29 PROCEDURE — 77067 SCR MAMMO BI INCL CAD: CPT | Mod: TC,PN

## 2021-06-29 PROCEDURE — 77063 MAMMO DIGITAL SCREENING BILAT WITH TOMO: ICD-10-PCS | Mod: 26,,, | Performed by: RADIOLOGY

## 2021-06-29 PROCEDURE — 3008F BODY MASS INDEX DOCD: CPT | Mod: CPTII,S$GLB,, | Performed by: OBSTETRICS & GYNECOLOGY

## 2021-06-29 PROCEDURE — 1126F AMNT PAIN NOTED NONE PRSNT: CPT | Mod: S$GLB,,, | Performed by: OBSTETRICS & GYNECOLOGY

## 2021-06-29 PROCEDURE — 77063 BREAST TOMOSYNTHESIS BI: CPT | Mod: 26,,, | Performed by: RADIOLOGY

## 2021-06-29 RX ORDER — HYDROXYZINE HYDROCHLORIDE 50 MG/1
50 TABLET, FILM COATED ORAL 3 TIMES DAILY PRN
Qty: 30 TABLET | Refills: 1 | Status: SHIPPED | OUTPATIENT
Start: 2021-06-29 | End: 2022-06-29

## 2021-07-06 LAB
FINAL PATHOLOGIC DIAGNOSIS: NORMAL
Lab: NORMAL

## 2021-09-15 ENCOUNTER — TELEPHONE (OUTPATIENT)
Dept: OBSTETRICS AND GYNECOLOGY | Facility: CLINIC | Age: 41
End: 2021-09-15

## 2021-09-22 ENCOUNTER — OFFICE VISIT (OUTPATIENT)
Dept: OBSTETRICS AND GYNECOLOGY | Facility: CLINIC | Age: 41
End: 2021-09-22
Payer: COMMERCIAL

## 2021-09-22 VITALS
SYSTOLIC BLOOD PRESSURE: 120 MMHG | DIASTOLIC BLOOD PRESSURE: 88 MMHG | WEIGHT: 130.31 LBS | BODY MASS INDEX: 23.08 KG/M2

## 2021-09-22 DIAGNOSIS — N92.1 MENORRHAGIA WITH IRREGULAR CYCLE: Primary | ICD-10-CM

## 2021-09-22 PROCEDURE — 3079F DIAST BP 80-89 MM HG: CPT | Mod: CPTII,S$GLB,, | Performed by: OBSTETRICS & GYNECOLOGY

## 2021-09-22 PROCEDURE — 3008F BODY MASS INDEX DOCD: CPT | Mod: CPTII,S$GLB,, | Performed by: OBSTETRICS & GYNECOLOGY

## 2021-09-22 PROCEDURE — 1159F MED LIST DOCD IN RCRD: CPT | Mod: CPTII,S$GLB,, | Performed by: OBSTETRICS & GYNECOLOGY

## 2021-09-22 PROCEDURE — 99213 OFFICE O/P EST LOW 20 MIN: CPT | Mod: S$GLB,,, | Performed by: OBSTETRICS & GYNECOLOGY

## 2021-09-22 PROCEDURE — 3079F PR MOST RECENT DIASTOLIC BLOOD PRESSURE 80-89 MM HG: ICD-10-PCS | Mod: CPTII,S$GLB,, | Performed by: OBSTETRICS & GYNECOLOGY

## 2021-09-22 PROCEDURE — 99999 PR PBB SHADOW E&M-EST. PATIENT-LVL III: ICD-10-PCS | Mod: PBBFAC,,, | Performed by: OBSTETRICS & GYNECOLOGY

## 2021-09-22 PROCEDURE — 3074F SYST BP LT 130 MM HG: CPT | Mod: CPTII,S$GLB,, | Performed by: OBSTETRICS & GYNECOLOGY

## 2021-09-22 PROCEDURE — 99999 PR PBB SHADOW E&M-EST. PATIENT-LVL III: CPT | Mod: PBBFAC,,, | Performed by: OBSTETRICS & GYNECOLOGY

## 2021-09-22 PROCEDURE — 99213 PR OFFICE/OUTPT VISIT, EST, LEVL III, 20-29 MIN: ICD-10-PCS | Mod: S$GLB,,, | Performed by: OBSTETRICS & GYNECOLOGY

## 2021-09-22 PROCEDURE — 1159F PR MEDICATION LIST DOCUMENTED IN MEDICAL RECORD: ICD-10-PCS | Mod: CPTII,S$GLB,, | Performed by: OBSTETRICS & GYNECOLOGY

## 2021-09-22 PROCEDURE — 3008F PR BODY MASS INDEX (BMI) DOCUMENTED: ICD-10-PCS | Mod: CPTII,S$GLB,, | Performed by: OBSTETRICS & GYNECOLOGY

## 2021-09-22 PROCEDURE — 3074F PR MOST RECENT SYSTOLIC BLOOD PRESSURE < 130 MM HG: ICD-10-PCS | Mod: CPTII,S$GLB,, | Performed by: OBSTETRICS & GYNECOLOGY

## 2021-09-28 ENCOUNTER — HOSPITAL ENCOUNTER (OUTPATIENT)
Dept: RADIOLOGY | Facility: HOSPITAL | Age: 41
Discharge: HOME | End: 2021-09-28
Attending: OBSTETRICS & GYNECOLOGY
Payer: COMMERCIAL

## 2021-09-28 DIAGNOSIS — N92.1 MENORRHAGIA WITH IRREGULAR CYCLE: ICD-10-CM

## 2021-09-28 PROCEDURE — 76856 US EXAM PELVIC COMPLETE: CPT | Mod: 26,,, | Performed by: RADIOLOGY

## 2021-09-28 PROCEDURE — 76830 US PELVIS COMPLETE WITH TRANSVAG FOR IUD: ICD-10-PCS | Mod: 26,,, | Performed by: RADIOLOGY

## 2021-09-28 PROCEDURE — 76856 US EXAM PELVIC COMPLETE: CPT | Mod: TC,PO

## 2021-09-28 PROCEDURE — 76856 US PELVIS COMPLETE WITH TRANSVAG FOR IUD: ICD-10-PCS | Mod: 26,,, | Performed by: RADIOLOGY

## 2021-09-28 PROCEDURE — 76830 TRANSVAGINAL US NON-OB: CPT | Mod: 26,,, | Performed by: RADIOLOGY

## 2021-10-01 ENCOUNTER — PATIENT MESSAGE (OUTPATIENT)
Dept: OBSTETRICS AND GYNECOLOGY | Facility: CLINIC | Age: 41
End: 2021-10-01

## 2021-12-28 ENCOUNTER — IMMUNIZATION (OUTPATIENT)
Dept: FAMILY MEDICINE | Facility: CLINIC | Age: 41
End: 2021-12-28
Payer: COMMERCIAL

## 2021-12-28 DIAGNOSIS — Z23 NEED FOR VACCINATION: Primary | ICD-10-CM

## 2021-12-28 PROCEDURE — 0004A COVID-19, MRNA, LNP-S, PF, 30 MCG/0.3 ML DOSE VACCINE: CPT | Mod: PBBFAC | Performed by: INTERNAL MEDICINE

## 2022-01-18 ENCOUNTER — OFFICE VISIT (OUTPATIENT)
Dept: OBSTETRICS AND GYNECOLOGY | Facility: CLINIC | Age: 42
End: 2022-01-18
Payer: COMMERCIAL

## 2022-01-18 VITALS
BODY MASS INDEX: 22.66 KG/M2 | WEIGHT: 127.88 LBS | HEIGHT: 63 IN | SYSTOLIC BLOOD PRESSURE: 122 MMHG | DIASTOLIC BLOOD PRESSURE: 88 MMHG

## 2022-01-18 DIAGNOSIS — Z30.432 ENCOUNTER FOR IUD REMOVAL: ICD-10-CM

## 2022-01-18 PROCEDURE — 99499 NO LOS: ICD-10-PCS | Mod: S$GLB,,, | Performed by: OBSTETRICS & GYNECOLOGY

## 2022-01-18 PROCEDURE — 3079F DIAST BP 80-89 MM HG: CPT | Mod: CPTII,S$GLB,, | Performed by: OBSTETRICS & GYNECOLOGY

## 2022-01-18 PROCEDURE — 3074F PR MOST RECENT SYSTOLIC BLOOD PRESSURE < 130 MM HG: ICD-10-PCS | Mod: CPTII,S$GLB,, | Performed by: OBSTETRICS & GYNECOLOGY

## 2022-01-18 PROCEDURE — 3074F SYST BP LT 130 MM HG: CPT | Mod: CPTII,S$GLB,, | Performed by: OBSTETRICS & GYNECOLOGY

## 2022-01-18 PROCEDURE — 3008F BODY MASS INDEX DOCD: CPT | Mod: CPTII,S$GLB,, | Performed by: OBSTETRICS & GYNECOLOGY

## 2022-01-18 PROCEDURE — 1159F MED LIST DOCD IN RCRD: CPT | Mod: CPTII,S$GLB,, | Performed by: OBSTETRICS & GYNECOLOGY

## 2022-01-18 PROCEDURE — 3079F PR MOST RECENT DIASTOLIC BLOOD PRESSURE 80-89 MM HG: ICD-10-PCS | Mod: CPTII,S$GLB,, | Performed by: OBSTETRICS & GYNECOLOGY

## 2022-01-18 PROCEDURE — 88300 SURGICAL PATH GROSS: CPT | Performed by: PATHOLOGY

## 2022-01-18 PROCEDURE — 88300 SURGICAL PATH GROSS: CPT | Mod: 26,,, | Performed by: PATHOLOGY

## 2022-01-18 PROCEDURE — 58301 REMOVE INTRAUTERINE DEVICE: CPT | Mod: S$GLB,,, | Performed by: OBSTETRICS & GYNECOLOGY

## 2022-01-18 PROCEDURE — 3008F PR BODY MASS INDEX (BMI) DOCUMENTED: ICD-10-PCS | Mod: CPTII,S$GLB,, | Performed by: OBSTETRICS & GYNECOLOGY

## 2022-01-18 PROCEDURE — 1159F PR MEDICATION LIST DOCUMENTED IN MEDICAL RECORD: ICD-10-PCS | Mod: CPTII,S$GLB,, | Performed by: OBSTETRICS & GYNECOLOGY

## 2022-01-18 PROCEDURE — 99999 PR PBB SHADOW E&M-EST. PATIENT-LVL III: CPT | Mod: PBBFAC,,, | Performed by: OBSTETRICS & GYNECOLOGY

## 2022-01-18 PROCEDURE — 99499 UNLISTED E&M SERVICE: CPT | Mod: S$GLB,,, | Performed by: OBSTETRICS & GYNECOLOGY

## 2022-01-18 PROCEDURE — 99999 PR PBB SHADOW E&M-EST. PATIENT-LVL III: ICD-10-PCS | Mod: PBBFAC,,, | Performed by: OBSTETRICS & GYNECOLOGY

## 2022-01-18 PROCEDURE — 58301 PR REMOVE, INTRAUTERINE DEVICE: ICD-10-PCS | Mod: S$GLB,,, | Performed by: OBSTETRICS & GYNECOLOGY

## 2022-01-18 PROCEDURE — 88300 PR  SURG PATH,GROSS,LEVEL I: ICD-10-PCS | Mod: 26,,, | Performed by: PATHOLOGY

## 2022-01-18 RX ORDER — NORETHINDRONE ACETATE AND ETHINYL ESTRADIOL .02; 1 MG/1; MG/1
1 TABLET ORAL DAILY
Qty: 30 TABLET | Refills: 3 | Status: SHIPPED | OUTPATIENT
Start: 2022-01-18 | End: 2022-03-25

## 2022-01-18 NOTE — PROGRESS NOTES
History of Present Illness:   Pateint presents today  For IUD removal - switch over to OCP to treat heavy menses, vasectomy for Birth control .    Pathology: none    Physical exam:  /78   Wt 82 kg (180 lb 12.4 oz)   BMI 36.51 kg/m²   Constitutional: She is oriented to person, place, and time. She appears well-developed and well-nourished. No distress.   HENT:   Head: Normocephalic and atraumatic.   Eyes: Conjunctivae and EOM are normal. No scleral icterus.   Neck: Normal range of motion. Neck supple. No tracheal deviation present.   Cardiovascular: Normal rate.    Pulmonary/Chest: Effort normal. No respiratory distress. She exhibits no tenderness.  Breasts: deferred  Abdominal: Soft. She exhibits no distension and no mass. There is no rebound and no guarding.   Genitourinary:     External rectal exam shows no thrombosed external hemorrhoids.    Pelvic exam was performed with patient supine.   There is no rash, lesion or injury on the right labia.   There is no rash, lesion or injury on the left labia.   No bleeding and no signs of injury around the vaginal introitus, urethra is without lesions and well supported. The cervix is visualized with no discharge, lesions or friability. Intrauterine Device string easily visualized, IUD removed with ring forceps without difficulty, tolerated well.    No vaginal discharge found.    No significant Cystocele, Enterocele or rectocele, and uterus well supported.   Bimanual exam: deferred  Neurological: She is alert and oriented to person, place, and time. Coordination normal.   Skin: Skin is warm and dry. She is not diaphoretic.   Psychiatric: She has a normal mood and affect.    Assessment:  IUD removal today.    Plan:  Resume normal activity and follow up as scheduled for routine screening.   microgestin oral contraceptive pills

## 2022-02-01 LAB
FINAL PATHOLOGIC DIAGNOSIS: NORMAL
GROSS: NORMAL
Lab: NORMAL

## 2022-03-25 RX ORDER — NORETHINDRONE ACETATE AND ETHINYL ESTRADIOL .02; 1 MG/1; MG/1
TABLET ORAL
Qty: 84 TABLET | Refills: 1 | Status: SHIPPED | OUTPATIENT
Start: 2022-03-25 | End: 2022-07-12 | Stop reason: SDUPTHER

## 2022-07-12 ENCOUNTER — TELEPHONE (OUTPATIENT)
Dept: OBSTETRICS AND GYNECOLOGY | Facility: CLINIC | Age: 42
End: 2022-07-12

## 2022-07-12 ENCOUNTER — OFFICE VISIT (OUTPATIENT)
Dept: OBSTETRICS AND GYNECOLOGY | Facility: CLINIC | Age: 42
End: 2022-07-12
Payer: COMMERCIAL

## 2022-07-12 ENCOUNTER — HOSPITAL ENCOUNTER (OUTPATIENT)
Dept: RADIOLOGY | Facility: HOSPITAL | Age: 42
Discharge: HOME | End: 2022-07-12
Attending: OBSTETRICS & GYNECOLOGY
Payer: COMMERCIAL

## 2022-07-12 ENCOUNTER — PATIENT MESSAGE (OUTPATIENT)
Dept: OBSTETRICS AND GYNECOLOGY | Facility: CLINIC | Age: 42
End: 2022-07-12

## 2022-07-12 VITALS — WEIGHT: 131.63 LBS | BODY MASS INDEX: 23.31 KG/M2

## 2022-07-12 DIAGNOSIS — Z01.419 WELL WOMAN EXAM: Primary | ICD-10-CM

## 2022-07-12 DIAGNOSIS — N92.1 MENORRHAGIA WITH IRREGULAR CYCLE: ICD-10-CM

## 2022-07-12 DIAGNOSIS — N94.6 DYSMENORRHEA: ICD-10-CM

## 2022-07-12 DIAGNOSIS — N92.1 BREAKTHROUGH BLEEDING WITH IUD: ICD-10-CM

## 2022-07-12 DIAGNOSIS — Z01.419 WELL WOMAN EXAM: ICD-10-CM

## 2022-07-12 DIAGNOSIS — Z01.419 ENCOUNTER FOR WELL WOMAN EXAM WITH ROUTINE GYNECOLOGICAL EXAM: ICD-10-CM

## 2022-07-12 DIAGNOSIS — Z97.5 BREAKTHROUGH BLEEDING WITH IUD: ICD-10-CM

## 2022-07-12 DIAGNOSIS — Z12.31 VISIT FOR SCREENING MAMMOGRAM: ICD-10-CM

## 2022-07-12 PROCEDURE — 1159F PR MEDICATION LIST DOCUMENTED IN MEDICAL RECORD: ICD-10-PCS | Mod: CPTII,S$GLB,, | Performed by: OBSTETRICS & GYNECOLOGY

## 2022-07-12 PROCEDURE — 77063 BREAST TOMOSYNTHESIS BI: CPT | Mod: 26,,, | Performed by: RADIOLOGY

## 2022-07-12 PROCEDURE — 77063 BREAST TOMOSYNTHESIS BI: CPT | Mod: TC,PN

## 2022-07-12 PROCEDURE — 3008F PR BODY MASS INDEX (BMI) DOCUMENTED: ICD-10-PCS | Mod: CPTII,S$GLB,, | Performed by: OBSTETRICS & GYNECOLOGY

## 2022-07-12 PROCEDURE — 77067 SCR MAMMO BI INCL CAD: CPT | Mod: 26,,, | Performed by: RADIOLOGY

## 2022-07-12 PROCEDURE — 3008F BODY MASS INDEX DOCD: CPT | Mod: CPTII,S$GLB,, | Performed by: OBSTETRICS & GYNECOLOGY

## 2022-07-12 PROCEDURE — 77067 MAMMO DIGITAL SCREENING BILAT WITH TOMO: ICD-10-PCS | Mod: 26,,, | Performed by: RADIOLOGY

## 2022-07-12 PROCEDURE — 99999 PR PBB SHADOW E&M-EST. PATIENT-LVL III: CPT | Mod: PBBFAC,,, | Performed by: OBSTETRICS & GYNECOLOGY

## 2022-07-12 PROCEDURE — 77063 MAMMO DIGITAL SCREENING BILAT WITH TOMO: ICD-10-PCS | Mod: 26,,, | Performed by: RADIOLOGY

## 2022-07-12 PROCEDURE — 77067 SCR MAMMO BI INCL CAD: CPT | Mod: TC,PN

## 2022-07-12 PROCEDURE — 99999 PR PBB SHADOW E&M-EST. PATIENT-LVL III: ICD-10-PCS | Mod: PBBFAC,,, | Performed by: OBSTETRICS & GYNECOLOGY

## 2022-07-12 PROCEDURE — 1159F MED LIST DOCD IN RCRD: CPT | Mod: CPTII,S$GLB,, | Performed by: OBSTETRICS & GYNECOLOGY

## 2022-07-12 PROCEDURE — 99396 PR PREVENTIVE VISIT,EST,40-64: ICD-10-PCS | Mod: S$GLB,,, | Performed by: OBSTETRICS & GYNECOLOGY

## 2022-07-12 PROCEDURE — 88175 CYTOPATH C/V AUTO FLUID REDO: CPT | Performed by: OBSTETRICS & GYNECOLOGY

## 2022-07-12 PROCEDURE — 99396 PREV VISIT EST AGE 40-64: CPT | Mod: S$GLB,,, | Performed by: OBSTETRICS & GYNECOLOGY

## 2022-07-12 RX ORDER — NORETHINDRONE ACETATE AND ETHINYL ESTRADIOL .02; 1 MG/1; MG/1
1 TABLET ORAL DAILY
Qty: 84 TABLET | Refills: 1 | Status: SHIPPED | OUTPATIENT
Start: 2022-07-12 | End: 2022-09-06 | Stop reason: SDUPTHER

## 2022-07-12 NOTE — PROGRESS NOTES
Chief Complaint   Patient presents with    Well Woman     Discus ablation       History and Physical:  Patient's last menstrual period was 2022.       Kimberly Pearce is a 41 y.o.   female who presents today for her routine annual GYN exam. The patient has continued heavy menses, on oral contraceptive pills slightly better byt still 7-8 days with clots / flooding 3-4 days , no ibntramenstrual bleeding. Failed IUD, recommended endoemtrial ablation - known uterine fibroids.       Ultrasound 2021:  FINDINGS:  The uterus is at the upper limits of normal in size and measures 8.8 x 5.3 x 5.7 cm.  There are 2 intramural uterine fibroids.  The larger of the 2 is located in the posterior uterine body along the midline and measures 6.0 x 4.9 x 5.1 cm.  This largest fibroid causes distortion of the endometrial cavity.  A 2nd fibroid is located within the uterine fundus and has a calcified wall.  This 2nd fibroid measures 3.6 x 3.1 x 3.6 cm.  There is an intrauterine contraceptive device which is difficult to visualize due to the presence of aforementioned fibroids.  The IUD appears to be in satisfactory position within the endometrial cavity.  Both ovaries are normal in size and appearance.  The right ovary measures 3.4 x 1.5 x 2.0 cm and the left ovary measures 2.7 x 1.2 x 1.4 cm.  No abnormal fluid collections are identified.  The bladder is unremarkable.       Allergies: Review of patient's allergies indicates:  No Known Allergies    Past Medical History:   Diagnosis Date    Abnormal Pap smear     Abnormal Pap smear of vagina     Scoliosis     Uterine fibroid        Past Surgical History:   Procedure Laterality Date    Breast Lift      BREAST RECONSTRUCTION      BREAST SURGERY       SECTION      COSMETIC SURGERY  2011    breast augmentation       MEDS:   Current Outpatient Medications on File Prior to Visit   Medication Sig Dispense Refill    ACZONE 7.5 % GlwP       azelastine  (ASTELIN) 137 mcg (0.1 %) nasal spray       multivitamin capsule Take 1 capsule by mouth once daily.      norethindrone-ethinyl estradiol (MICROGESTIN ) 1-20 mg-mcg per tablet TAKE 1 TABLET BY MOUTH EVERY DAY 84 tablet 1    spironolactone (ALDACTONE) 25 MG tablet Take 1 tablet (25 mg total) by mouth 2 (two) times daily as needed (fluid retention). 30 tablet 3    [DISCONTINUED] estradioL (ESTRACE) 2 MG tablet Take 1 tablet (2 mg total) by mouth once daily. (Patient not taking: Reported on 2021) 30 tablet 0    [DISCONTINUED] levonorgestreL (MIRENA) 20 mcg/24 hours (5 yrs) 52 mg IUD 1 Intra Uterine Device by Intrauterine route once. for 1 dose 1 each 0     No current facility-administered medications on file prior to visit.       OB History        2    Para   2    Term   1            AB        Living           SAB        IAB        Ectopic        Multiple        Live Births                     Social History     Socioeconomic History    Marital status:    Tobacco Use    Smoking status: Never Smoker    Smokeless tobacco: Never Used   Substance and Sexual Activity    Alcohol use: Yes     Alcohol/week: 3.3 standard drinks     Types: 4 Standard drinks or equivalent per week    Drug use: No    Sexual activity: Yes     Partners: Male     Birth control/protection: OCP       Family History   Problem Relation Age of Onset    Diabetes Maternal Grandmother     Breast cancer Neg Hx     Ovarian cancer Neg Hx          Past medical and surgical history reviewed.   I have reviewed the patient's medical history in detail and updated the computerized patient record.        Review of System:   General: no chills, fever, night sweats, weight gain or weight loss  Psychological: no depression or suicidal ideation  Breasts: no new or changing breast lumps, nipple discharge or masses.  Respiratory: no cough, shortness of breath, or wheezing  Cardiovascular: no chest pain or dyspnea on  exertion  Gastrointestinal: no abdominal pain, change in bowel habits, or black or bloody stools  Genito-Urinary: no incontinence, urinary frequency/urgency or vulvar/vaginal symptoms.   Musculoskeletal: no gait disturbance or muscular weakness      Physical Exam:   Wt 59.7 kg (131 lb 9.8 oz)   LMP 07/05/2022   BMI 23.31 kg/m²   Constitutional: She appears alert and responsive. She appears well-developed, well-groomed, and well-nourished. No distress. Thin  HENT:   Head: Normocephalic and atraumatic.   Eyes: Conjunctivae and EOM are normal. No scleral icterus.   Neck: Symmetrical. Normal range of motion. Neck supple. No tracheal deviation present.   Cardiovascular: Normal rate, no rhythm abnormality noted. Extremities without swelling or edema, warm.    Pulmonary/Chest: Normal respiratory Effort. No distress or retractions. She exhibits no tenderness.  Breasts: are symmetrical.   Right breast exhibits no inverted nipple, no mass, no nipple discharge, no skin change and no tenderness.   Left breast exhibits no inverted nipple, no mass, no nipple discharge, no skin change and no tenderness.  Abdominal: Soft. She exhibits no distension, hernias or masses. There is no tenderness. No enlargement of liver edge or spleen.  There is no rebound and no guarding.   Genitourinary:    External rectal exam shows no thrombosed external hemorrhoids, no lesions.     Pelvic exam was performed with patient supine.   No labial fusion, and symmetrical.    There is no rash, lesion or injury on the right labia.   There is no rash, lesion or injury on the left labia.   No bleeding and no signs of injury around the vaginal introitus, urethral meatus is normal size and without prolapse or lesions, urethra well supported. The cervix is visualized with no discharge, lesions or friability.   No vaginal discharge found.    No significant Cystocele, Enterocele or rectocele, and cervix and uterus well supported.   Bimanual exam:   The urethra is  normal to palpation and there are no palpable vaginal wall masses.   Uterus is not deviated, not enlarged, not fixed, nodular shape and not tender.   Cervix exhibits no motion tenderness.    Right adnexum displays no mass or nodularity and no tenderness.   Left adnexum displays no mass or nodularity and no tenderness.  Musculoskeletal: Normal range of motion.   Lymphadenopathy: No inguinal adenopathy present.   Neurological: She is alert and oriented to person, place, and time. Coordination normal.   Skin: Skin is warm and dry. She is not diaphoretic. No rashes, lesions or ulcers.   Psychiatric: She has a normal mood and affect, oriented to person, place, and time.      Assessment:   Normal annual GYN exam  1. Well woman exam  Mammo Digital Screening Bilat w/ Daniel   uterine fibroids, menorrhagia - failed medical mgmt    Plan:   PAP  Mammogram  Refill oral contraceptive pills - schedule ablation  Follow up in 1 year.  Patient informed will be contacted with results within 2 weeks. Encouraged to please call back or email if she has not heard from us by then.

## 2022-07-12 NOTE — TELEPHONE ENCOUNTER
----- Message from Agnieszka Briones MA sent at 7/12/2022 12:00 PM CDT -----  Regarding: FW: surgery    ----- Message -----  From: Kristian Barnett MD  Sent: 7/12/2022  10:54 AM CDT  To: [facility] Staff  Subject: surgery                                          Dx:  Menorrhagia  Dysmenorrhea  Failed IUD and medical mgmt    PLAN:  Novasure endometrial ablation, [facility]    Request after school starts. Thru or Friday  GREGORY, 45 min

## 2022-07-20 ENCOUNTER — PATIENT MESSAGE (OUTPATIENT)
Dept: OBSTETRICS AND GYNECOLOGY | Facility: CLINIC | Age: 42
End: 2022-07-20
Payer: COMMERCIAL

## 2022-08-03 ENCOUNTER — TELEPHONE (OUTPATIENT)
Dept: OBSTETRICS AND GYNECOLOGY | Facility: CLINIC | Age: 42
End: 2022-08-03
Payer: COMMERCIAL

## 2022-08-03 NOTE — TELEPHONE ENCOUNTER
LMOV in attempt to reach pt in reference to pt BC. LM for pt to call clinic back whenever she gets a chance.

## 2022-08-03 NOTE — TELEPHONE ENCOUNTER
----- Message from Kristian Barnett MD sent at 8/3/2022  4:13 PM CDT -----  Contact: Belia  What does her last note say?  Can ask patient if taking continuous or not.  ----- Message -----  From: Loyda Haywood MA  Sent: 8/3/2022   3:43 PM CDT  To: Kristian Barnett MD    Please advise to how this pt is to take this medication.  ----- Message -----  From: Madhav March  Sent: 8/3/2022  11:45 AM CDT  To: Ericka [facility] Staff    Type:  Pharmacy Calling to Clarify an RX  Name of Caller:  Belia  Pharmacy Name:  [id number] [place] [address] AT [address] [place] [address]   Phone:  [phone]  Fax:  [phone]  Prescription Name:  norethindrone-ethinyl estradiol (MICROGESTIN 1/20) 1-20 mg-mcg per tablet 84 tablet   What do they need to clarify?: if the pt is having a cycle every month OR should she take this Rx continuously   Best Call Back Number:  [phone]  Additional Information:  Belia requesting a call back concerning f the pt is having a cycle every month OR should she take this Rx continuously

## 2022-09-06 ENCOUNTER — OFFICE VISIT (OUTPATIENT)
Dept: OBSTETRICS AND GYNECOLOGY | Facility: CLINIC | Age: 42
End: 2022-09-06
Payer: COMMERCIAL

## 2022-09-06 VITALS
WEIGHT: 130.94 LBS | SYSTOLIC BLOOD PRESSURE: 134 MMHG | DIASTOLIC BLOOD PRESSURE: 86 MMHG | BODY MASS INDEX: 23.2 KG/M2 | HEIGHT: 63 IN

## 2022-09-06 DIAGNOSIS — N94.6 DYSMENORRHEA: Primary | ICD-10-CM

## 2022-09-06 DIAGNOSIS — N92.1 MENORRHAGIA WITH IRREGULAR CYCLE: ICD-10-CM

## 2022-09-06 DIAGNOSIS — N92.1 MENOMETRORRHAGIA: ICD-10-CM

## 2022-09-06 PROCEDURE — 3079F PR MOST RECENT DIASTOLIC BLOOD PRESSURE 80-89 MM HG: ICD-10-PCS | Mod: CPTII,S$GLB,, | Performed by: OBSTETRICS & GYNECOLOGY

## 2022-09-06 PROCEDURE — 99213 OFFICE O/P EST LOW 20 MIN: CPT | Mod: 57,S$GLB,, | Performed by: OBSTETRICS & GYNECOLOGY

## 2022-09-06 PROCEDURE — 99999 PR PBB SHADOW E&M-EST. PATIENT-LVL III: CPT | Mod: PBBFAC,,, | Performed by: OBSTETRICS & GYNECOLOGY

## 2022-09-06 PROCEDURE — 1159F PR MEDICATION LIST DOCUMENTED IN MEDICAL RECORD: ICD-10-PCS | Mod: CPTII,S$GLB,, | Performed by: OBSTETRICS & GYNECOLOGY

## 2022-09-06 PROCEDURE — 3075F PR MOST RECENT SYSTOLIC BLOOD PRESS GE 130-139MM HG: ICD-10-PCS | Mod: CPTII,S$GLB,, | Performed by: OBSTETRICS & GYNECOLOGY

## 2022-09-06 PROCEDURE — 3008F BODY MASS INDEX DOCD: CPT | Mod: CPTII,S$GLB,, | Performed by: OBSTETRICS & GYNECOLOGY

## 2022-09-06 PROCEDURE — 3075F SYST BP GE 130 - 139MM HG: CPT | Mod: CPTII,S$GLB,, | Performed by: OBSTETRICS & GYNECOLOGY

## 2022-09-06 PROCEDURE — 3008F PR BODY MASS INDEX (BMI) DOCUMENTED: ICD-10-PCS | Mod: CPTII,S$GLB,, | Performed by: OBSTETRICS & GYNECOLOGY

## 2022-09-06 PROCEDURE — 99999 PR PBB SHADOW E&M-EST. PATIENT-LVL III: ICD-10-PCS | Mod: PBBFAC,,, | Performed by: OBSTETRICS & GYNECOLOGY

## 2022-09-06 PROCEDURE — 99213 PR OFFICE/OUTPT VISIT, EST, LEVL III, 20-29 MIN: ICD-10-PCS | Mod: 57,S$GLB,, | Performed by: OBSTETRICS & GYNECOLOGY

## 2022-09-06 PROCEDURE — 3079F DIAST BP 80-89 MM HG: CPT | Mod: CPTII,S$GLB,, | Performed by: OBSTETRICS & GYNECOLOGY

## 2022-09-06 PROCEDURE — 1159F MED LIST DOCD IN RCRD: CPT | Mod: CPTII,S$GLB,, | Performed by: OBSTETRICS & GYNECOLOGY

## 2022-09-06 RX ORDER — MUPIROCIN 20 MG/G
OINTMENT TOPICAL
Status: CANCELLED | OUTPATIENT
Start: 2022-09-06

## 2022-09-06 RX ORDER — SPIRONOLACTONE 50 MG/1
50 TABLET, FILM COATED ORAL DAILY
COMMUNITY
Start: 2022-08-02

## 2022-09-06 RX ORDER — NORETHINDRONE ACETATE AND ETHINYL ESTRADIOL .02; 1 MG/1; MG/1
1 TABLET ORAL DAILY
Qty: 28 TABLET | Refills: 1 | Status: SHIPPED | OUTPATIENT
Start: 2022-09-06 | End: 2022-09-06

## 2022-09-06 RX ORDER — SODIUM CHLORIDE 9 MG/ML
INJECTION, SOLUTION INTRAVENOUS CONTINUOUS
Status: CANCELLED | OUTPATIENT
Start: 2022-09-06

## 2022-09-06 RX ORDER — MONTELUKAST SODIUM 10 MG/1
10 TABLET ORAL DAILY
COMMUNITY
Start: 2022-06-30 | End: 2023-09-05 | Stop reason: ALTCHOICE

## 2022-09-06 NOTE — PROGRESS NOTES
ADMISSION H&P:  2022      Chief complaint: heavy menses      Indications for Surgery: menorrhagia - undesponsive to meds      History of present illness:  Kimberly Pearce 42 y.o.   female with continued heavy menses, on oral contraceptive pills slightly better but still 7-8 days with clots / flooding 3-4 days , no intramenstrual bleeding. Failed IUD, recommended endoemtrial ablation - known uterine fibroids.    mirena in 2020  vasectomy 2021     Ultrasound 2021:  FINDINGS:  The uterus is at the upper limits of normal in size and measures 8.8 x 5.3 x 5.7 cm.  There are 2 intramural uterine fibroids.  The larger of the 2 is located in the posterior uterine body along the midline and measures 6.0 x 4.9 x 5.1 cm.  This largest fibroid causes distortion of the endometrial cavity.  A 2nd fibroid is located within the uterine fundus and has a calcified wall.  This 2nd fibroid measures 3.6 x 3.1 x 3.6 cm.  There is an intrauterine contraceptive device which is difficult to visualize due to the presence of aforementioned fibroids.  The IUD appears to be in satisfactory position within the endometrial cavity.  Both ovaries are normal in size and appearance.  The right ovary measures 3.4 x 1.5 x 2.0 cm and the left ovary measures 2.7 x 1.2 x 1.4 cm.  No abnormal fluid collections are identified.  The bladder is unremarkable.      Allergies: Review of patient's allergies indicates:  No Known Allergies    Past Medical History:   Diagnosis Date    Abnormal Pap smear     Abnormal Pap smear of vagina     Scoliosis     Uterine fibroid        Past Surgical History:   Procedure Laterality Date    AUGMENTATION OF BREAST      Breast Lift      BREAST RECONSTRUCTION      BREAST SURGERY       SECTION      COSMETIC SURGERY  2011    breast augmentation       MEDS:   Current Outpatient Medications on File Prior to Visit   Medication Sig Dispense Refill    ACZONE 7.5 % GlwP       azelastine  (ASTELIN) 137 mcg (0.1 %) nasal spray       multivitamin capsule Take 1 capsule by mouth once daily.      norethindrone-ethinyl estradiol (MICROGESTIN ) 1-20 mg-mcg per tablet Take 1 tablet by mouth once daily. 84 tablet 1    spironolactone (ALDACTONE) 25 MG tablet Take 1 tablet (25 mg total) by mouth 2 (two) times daily as needed (fluid retention). 30 tablet 3     No current facility-administered medications on file prior to visit.       OB History          2    Para   2    Term   1            AB        Living             SAB        IAB        Ectopic        Multiple        Live Births                     Social History     Socioeconomic History    Marital status:    Tobacco Use    Smoking status: Never    Smokeless tobacco: Never   Substance and Sexual Activity    Alcohol use: Yes     Alcohol/week: 3.3 standard drinks     Types: 4 Standard drinks or equivalent per week    Drug use: No    Sexual activity: Yes     Partners: Male     Birth control/protection: OCP       Family History   Problem Relation Age of Onset    Diabetes Maternal Grandmother     Breast cancer Neg Hx     Ovarian cancer Neg Hx         Review of System:   General: no chills, fever, night sweats, weight gain or weight loss  Psychological: no depression or suicidal ideation  Breasts: no new or changing breast lumps, nipple discharge or masses.  Respiratory: no cough, shortness of breath, or wheezing  Cardiovascular: no chest pain or dyspnea on exertion  Gastrointestinal: no abdominal pain, change in bowel habits, or black or bloody stools  Genito-Urinary: no incontinence, urinary frequency/urgency or vulvar/vaginal symptoms.   Musculoskeletal: no gait disturbance or muscular weakness        Physical Exam:   Wt 59.7 kg (131 lb 9.8 oz)   LMP 2022   BMI 23.31 kg/m²   Constitutional: She appears alert and responsive. She appears well-developed, well-groomed, and well-nourished. No distress. Thin  HENT:   Head:  Normocephalic and atraumatic.   Eyes: Conjunctivae and EOM are normal. No scleral icterus.   Neck: Symmetrical. Normal range of motion. Neck supple. No tracheal deviation present.   Cardiovascular: Normal rate, no rhythm abnormality noted. Extremities without swelling or edema, warm.    Pulmonary/Chest: Normal respiratory Effort. No distress or retractions. She exhibits no tenderness  Abdominal: Soft. She exhibits no distension, hernias or masses. There is no tenderness. No enlargement of liver edge or spleen.  There is no rebound and no guarding.   Genitourinary:    External rectal exam shows no thrombosed external hemorrhoids, no lesions.     Pelvic exam was performed with patient supine.   No labial fusion, and symmetrical.    There is no rash, lesion or injury on the right labia.   There is no rash, lesion or injury on the left labia.   No bleeding and no signs of injury around the vaginal introitus, urethral meatus is normal size and without prolapse or lesions, urethra well supported. The cervix is visualized with no discharge, lesions or friability.   No vaginal discharge found.    No significant Cystocele, Enterocele or rectocele, and cervix and uterus well supported.   Bimanual exam:   The urethra is normal to palpation and there are no palpable vaginal wall masses.   Uterus is not deviated, not enlarged, not fixed, nodular shape and not tender.   Cervix exhibits no motion tenderness.    Right adnexum displays no mass or nodularity and no tenderness.   Left adnexum displays no mass or nodularity and no tenderness. SKIN TAG - REQUEST REMOVAL  Musculoskeletal: Normal range of motion.   Neurological: She is alert and oriented to person, place, and time. Coordination normal.   Skin: Skin is warm and dry. She is not diaphoretic. No rashes, lesions or ulcers.   Psychiatric: She has a normal mood and affect, oriented to person, place, and time.        Assessment:   Normal GYN exam  1. Well woman exam  Mammo  Digital Screening Bilat w/ Daniel   uterine fibroids, menorrhagia - failed medical mgmt   vasectomy for Birth control     Plan:   Hysteroscopy, D&C, endometrial ablation   removal left labial skin tag      Kristian Barnett M.D., FACOG

## 2022-09-08 ENCOUNTER — TELEPHONE (OUTPATIENT)
Dept: OBSTETRICS AND GYNECOLOGY | Facility: CLINIC | Age: 42
End: 2022-09-08

## 2022-09-08 PROBLEM — N92.1 MENORRHAGIA WITH IRREGULAR CYCLE: Status: ACTIVE | Noted: 2022-09-08

## 2022-09-08 PROBLEM — N94.6 DYSMENORRHEA: Status: ACTIVE | Noted: 2022-09-08

## 2022-09-08 RX ORDER — NORETHINDRONE ACETATE AND ETHINYL ESTRADIOL .03; 1.5 MG/1; MG/1
1 TABLET ORAL DAILY
Qty: 28 EACH | Refills: 2 | Status: SHIPPED | OUTPATIENT
Start: 2022-09-08 | End: 2022-09-21 | Stop reason: ALTCHOICE

## 2022-09-21 ENCOUNTER — OFFICE VISIT (OUTPATIENT)
Dept: OBSTETRICS AND GYNECOLOGY | Facility: CLINIC | Age: 42
End: 2022-09-21
Payer: COMMERCIAL

## 2022-09-21 VITALS
SYSTOLIC BLOOD PRESSURE: 112 MMHG | WEIGHT: 126.75 LBS | DIASTOLIC BLOOD PRESSURE: 76 MMHG | HEIGHT: 63 IN | BODY MASS INDEX: 22.46 KG/M2

## 2022-09-21 DIAGNOSIS — Z09 POSTOP CHECK: ICD-10-CM

## 2022-09-21 DIAGNOSIS — N94.6 DYSMENORRHEA: Primary | ICD-10-CM

## 2022-09-21 PROCEDURE — 1159F MED LIST DOCD IN RCRD: CPT | Mod: CPTII,S$GLB,, | Performed by: OBSTETRICS & GYNECOLOGY

## 2022-09-21 PROCEDURE — 99024 POSTOP FOLLOW-UP VISIT: CPT | Mod: S$GLB,,, | Performed by: OBSTETRICS & GYNECOLOGY

## 2022-09-21 PROCEDURE — 3074F PR MOST RECENT SYSTOLIC BLOOD PRESSURE < 130 MM HG: ICD-10-PCS | Mod: CPTII,S$GLB,, | Performed by: OBSTETRICS & GYNECOLOGY

## 2022-09-21 PROCEDURE — 99024 PR POST-OP FOLLOW-UP VISIT: ICD-10-PCS | Mod: S$GLB,,, | Performed by: OBSTETRICS & GYNECOLOGY

## 2022-09-21 PROCEDURE — 3008F PR BODY MASS INDEX (BMI) DOCUMENTED: ICD-10-PCS | Mod: CPTII,S$GLB,, | Performed by: OBSTETRICS & GYNECOLOGY

## 2022-09-21 PROCEDURE — 3078F DIAST BP <80 MM HG: CPT | Mod: CPTII,S$GLB,, | Performed by: OBSTETRICS & GYNECOLOGY

## 2022-09-21 PROCEDURE — 1159F PR MEDICATION LIST DOCUMENTED IN MEDICAL RECORD: ICD-10-PCS | Mod: CPTII,S$GLB,, | Performed by: OBSTETRICS & GYNECOLOGY

## 2022-09-21 PROCEDURE — 99999 PR PBB SHADOW E&M-EST. PATIENT-LVL III: ICD-10-PCS | Mod: PBBFAC,,, | Performed by: OBSTETRICS & GYNECOLOGY

## 2022-09-21 PROCEDURE — 99999 PR PBB SHADOW E&M-EST. PATIENT-LVL III: CPT | Mod: PBBFAC,,, | Performed by: OBSTETRICS & GYNECOLOGY

## 2022-09-21 PROCEDURE — 3074F SYST BP LT 130 MM HG: CPT | Mod: CPTII,S$GLB,, | Performed by: OBSTETRICS & GYNECOLOGY

## 2022-09-21 PROCEDURE — 3008F BODY MASS INDEX DOCD: CPT | Mod: CPTII,S$GLB,, | Performed by: OBSTETRICS & GYNECOLOGY

## 2022-09-21 PROCEDURE — 3078F PR MOST RECENT DIASTOLIC BLOOD PRESSURE < 80 MM HG: ICD-10-PCS | Mod: CPTII,S$GLB,, | Performed by: OBSTETRICS & GYNECOLOGY

## 2022-09-21 NOTE — PROGRESS NOTES
History of Present Illness:   Pateint presents today  2 weeks postop, status post ablation, with complaint of none.  Novasure: 5.5cm ans width 3.5cm, Power 106watts x 52seconds.       Pathology:     9/8/22: path  1. LEFT LABIA, BIOPSY   - ACROCHORDON   2. ENDOMETRIUM, CURETTAGE   - FRAGMENTS OF BENIGN ENDOCERVICAL AND SQUAMOUS MUCOSA     Past medical and surgical history reviewed.   I have reviewed the patient's medical history in detail and updated the computerized patient record.    Physical exam:  Vital Signs: as above, reviewed.  Constitutional: She is oriented to person, place, and time. She appears well-developed and well-nourished. No distress.   HENT:   Head: Normocephalic and atraumatic.   Eyes: Conjunctivae and EOM are normal. No scleral icterus.   Neck: Normal range of motion. Neck supple. No tracheal deviation present.   Cardiovascular: Normal rate.    Pulmonary/Chest: Effort normal. No respiratory distress. She exhibits no tenderness.  Breasts: deferred  Abdominal: Soft. She exhibits no distension and no mass. There is no rebound and no guarding.   Genitourinary: Deferred  Musculoskeletal: Normal range of motion.   Lymphadenopathy: No inguinal adenopathy present.   Neurological: She is alert and oriented to person, place, and time. Coordination normal.   Skin: Skin is warm and dry. She is not diaphoretic.   Psychiatric: She has a normal mood and affect.    Assessment:  Normal postop    Plan:  Follow up  1 year  Resume normal activity    Therapy plan entered, please sign

## 2022-10-04 ENCOUNTER — PATIENT MESSAGE (OUTPATIENT)
Dept: OBSTETRICS AND GYNECOLOGY | Facility: CLINIC | Age: 42
End: 2022-10-04
Payer: COMMERCIAL

## 2023-02-14 ENCOUNTER — LAB VISIT (OUTPATIENT)
Dept: LAB | Facility: HOSPITAL | Age: 43
End: 2023-02-14
Attending: OBSTETRICS & GYNECOLOGY
Payer: COMMERCIAL

## 2023-02-14 ENCOUNTER — PATIENT MESSAGE (OUTPATIENT)
Dept: OBSTETRICS AND GYNECOLOGY | Facility: CLINIC | Age: 43
End: 2023-02-14

## 2023-02-14 ENCOUNTER — TELEPHONE (OUTPATIENT)
Dept: OBSTETRICS AND GYNECOLOGY | Facility: CLINIC | Age: 43
End: 2023-02-14
Payer: COMMERCIAL

## 2023-02-14 ENCOUNTER — OFFICE VISIT (OUTPATIENT)
Dept: OBSTETRICS AND GYNECOLOGY | Facility: CLINIC | Age: 43
End: 2023-02-14
Payer: COMMERCIAL

## 2023-02-14 VITALS
SYSTOLIC BLOOD PRESSURE: 140 MMHG | BODY MASS INDEX: 21.05 KG/M2 | WEIGHT: 118.81 LBS | DIASTOLIC BLOOD PRESSURE: 98 MMHG | HEIGHT: 63 IN

## 2023-02-14 DIAGNOSIS — F50.89 PICA IN ADULTS: ICD-10-CM

## 2023-02-14 DIAGNOSIS — F50.89 PICA IN ADULTS: Primary | ICD-10-CM

## 2023-02-14 LAB
ALBUMIN SERPL BCP-MCNC: 4.2 G/DL (ref 3.5–5.2)
ALP SERPL-CCNC: 79 U/L (ref 55–135)
ALT SERPL W/O P-5'-P-CCNC: 57 U/L (ref 10–44)
ANION GAP SERPL CALC-SCNC: 11 MMOL/L (ref 8–16)
AST SERPL-CCNC: 64 U/L (ref 10–40)
BILIRUB SERPL-MCNC: 0.6 MG/DL (ref 0.1–1)
BUN SERPL-MCNC: 9 MG/DL (ref 6–20)
CALCIUM SERPL-MCNC: 10.1 MG/DL (ref 8.7–10.5)
CHLORIDE SERPL-SCNC: 105 MMOL/L (ref 95–110)
CO2 SERPL-SCNC: 22 MMOL/L (ref 23–29)
CREAT SERPL-MCNC: 0.7 MG/DL (ref 0.5–1.4)
ERYTHROCYTE [DISTWIDTH] IN BLOOD BY AUTOMATED COUNT: 18.3 % (ref 11.5–14.5)
EST. GFR  (NO RACE VARIABLE): >60 ML/MIN/1.73 M^2
FERRITIN SERPL-MCNC: 18 NG/ML (ref 20–300)
GLUCOSE SERPL-MCNC: 97 MG/DL (ref 70–110)
HCT VFR BLD AUTO: 33.1 % (ref 37–48.5)
HGB BLD-MCNC: 10.2 G/DL (ref 12–16)
IRON SERPL-MCNC: 47 UG/DL (ref 30–160)
MCH RBC QN AUTO: 25.6 PG (ref 27–31)
MCHC RBC AUTO-ENTMCNC: 30.8 G/DL (ref 32–36)
MCV RBC AUTO: 83 FL (ref 82–98)
PLATELET # BLD AUTO: 390 K/UL (ref 150–450)
PMV BLD AUTO: 10.6 FL (ref 9.2–12.9)
POTASSIUM SERPL-SCNC: 4.5 MMOL/L (ref 3.5–5.1)
PROT SERPL-MCNC: 6.9 G/DL (ref 6–8.4)
RBC # BLD AUTO: 3.98 M/UL (ref 4–5.4)
SATURATED IRON: 9 % (ref 20–50)
SODIUM SERPL-SCNC: 138 MMOL/L (ref 136–145)
TOTAL IRON BINDING CAPACITY: 500 UG/DL (ref 250–450)
TRANSFERRIN SERPL-MCNC: 338 MG/DL (ref 200–375)
TSH SERPL DL<=0.005 MIU/L-ACNC: 1.5 UIU/ML (ref 0.4–4)
WBC # BLD AUTO: 4.7 K/UL (ref 3.9–12.7)

## 2023-02-14 PROCEDURE — 3077F PR MOST RECENT SYSTOLIC BLOOD PRESSURE >= 140 MM HG: ICD-10-PCS | Mod: CPTII,S$GLB,, | Performed by: OBSTETRICS & GYNECOLOGY

## 2023-02-14 PROCEDURE — 99212 PR OFFICE/OUTPT VISIT, EST, LEVL II, 10-19 MIN: ICD-10-PCS | Mod: S$GLB,,, | Performed by: OBSTETRICS & GYNECOLOGY

## 2023-02-14 PROCEDURE — 36415 COLL VENOUS BLD VENIPUNCTURE: CPT | Mod: PN | Performed by: OBSTETRICS & GYNECOLOGY

## 2023-02-14 PROCEDURE — 99999 PR PBB SHADOW E&M-EST. PATIENT-LVL III: ICD-10-PCS | Mod: PBBFAC,,, | Performed by: OBSTETRICS & GYNECOLOGY

## 2023-02-14 PROCEDURE — 84443 ASSAY THYROID STIM HORMONE: CPT | Performed by: OBSTETRICS & GYNECOLOGY

## 2023-02-14 PROCEDURE — 3008F PR BODY MASS INDEX (BMI) DOCUMENTED: ICD-10-PCS | Mod: CPTII,S$GLB,, | Performed by: OBSTETRICS & GYNECOLOGY

## 2023-02-14 PROCEDURE — 3008F BODY MASS INDEX DOCD: CPT | Mod: CPTII,S$GLB,, | Performed by: OBSTETRICS & GYNECOLOGY

## 2023-02-14 PROCEDURE — 99999 PR PBB SHADOW E&M-EST. PATIENT-LVL III: CPT | Mod: PBBFAC,,, | Performed by: OBSTETRICS & GYNECOLOGY

## 2023-02-14 PROCEDURE — 99212 OFFICE O/P EST SF 10 MIN: CPT | Mod: S$GLB,,, | Performed by: OBSTETRICS & GYNECOLOGY

## 2023-02-14 PROCEDURE — 85027 COMPLETE CBC AUTOMATED: CPT | Performed by: OBSTETRICS & GYNECOLOGY

## 2023-02-14 PROCEDURE — 3077F SYST BP >= 140 MM HG: CPT | Mod: CPTII,S$GLB,, | Performed by: OBSTETRICS & GYNECOLOGY

## 2023-02-14 PROCEDURE — 1159F PR MEDICATION LIST DOCUMENTED IN MEDICAL RECORD: ICD-10-PCS | Mod: CPTII,S$GLB,, | Performed by: OBSTETRICS & GYNECOLOGY

## 2023-02-14 PROCEDURE — 84466 ASSAY OF TRANSFERRIN: CPT | Performed by: OBSTETRICS & GYNECOLOGY

## 2023-02-14 PROCEDURE — 3080F PR MOST RECENT DIASTOLIC BLOOD PRESSURE >= 90 MM HG: ICD-10-PCS | Mod: CPTII,S$GLB,, | Performed by: OBSTETRICS & GYNECOLOGY

## 2023-02-14 PROCEDURE — 1159F MED LIST DOCD IN RCRD: CPT | Mod: CPTII,S$GLB,, | Performed by: OBSTETRICS & GYNECOLOGY

## 2023-02-14 PROCEDURE — 80053 COMPREHEN METABOLIC PANEL: CPT | Performed by: OBSTETRICS & GYNECOLOGY

## 2023-02-14 PROCEDURE — 82728 ASSAY OF FERRITIN: CPT | Performed by: OBSTETRICS & GYNECOLOGY

## 2023-02-14 PROCEDURE — 3080F DIAST BP >= 90 MM HG: CPT | Mod: CPTII,S$GLB,, | Performed by: OBSTETRICS & GYNECOLOGY

## 2023-02-14 RX ORDER — PHENTERMINE HYDROCHLORIDE 37.5 MG/1
37.5 TABLET ORAL
COMMUNITY
Start: 2023-02-01 | End: 2023-09-05 | Stop reason: ALTCHOICE

## 2023-02-14 RX ORDER — TOPIRAMATE 25 MG/1
25 TABLET ORAL
COMMUNITY
Start: 2023-02-01 | End: 2023-09-05 | Stop reason: ALTCHOICE

## 2023-02-14 NOTE — TELEPHONE ENCOUNTER
----- Message from Jennifer Flores sent at 2/14/2023  8:14 AM CST -----  Contact: Self  Type:  Patient Requesting A Return Call    Who Called:  Patient  Who Left Message for Patient:  N/A- Needs to speak to theNurse  Does the patient know what this is regarding?:  Pts appt at 11  Best Call Back Number:  [phone]  Additional Information:  Please call pt back to discuss. Thank You

## 2023-02-14 NOTE — PROGRESS NOTES
"Chief Complaint   Patient presents with    Follow up, Lab orders       History of Present Illness   42 y.o.  female   patient presents today for lab testing request, menses decreasing since ablation 4 months ago, but having pica for ice over last 1-2 months. Counseled.     Past medical and surgical history reviewed.   I have reviewed the patient's medical history in detail and updated the computerized patient record.    Review of patient's allergies indicates:  No Known Allergies      Review of Systems - Negative except HPI  GEN ROS: negative for - chills or fever  Breast ROS: negative for breast lumps  Genito-Urinary ROS: no dysuria, trouble voiding, or hematuria      Physical Examination:  BP (!) 140/98   Ht 5' 3" (1.6 m)   Wt 53.9 kg (118 lb 13.3 oz)   BMI 21.05 kg/m²    def      Assessment:  1. Pica in adults  CBC Without Differential    Iron and TIBC    FERRITIN    TSH    Comprehensive Metabolic Panel          Plan:  Screening anemia labs  Patient informed will be contacted with results within 2 weeks. Encouraged to please call back or email if she has not heard from us by then.          "

## 2023-02-20 ENCOUNTER — TELEPHONE (OUTPATIENT)
Dept: OBSTETRICS AND GYNECOLOGY | Facility: CLINIC | Age: 43
End: 2023-02-20
Payer: COMMERCIAL

## 2023-02-20 NOTE — TELEPHONE ENCOUNTER
----- Message from Kristian Barnett MD sent at 2/18/2023  1:59 PM CST -----  Regarding: labs  Slightly elevated liver functions, and anemia - iron deff.   Recommend start iron twice a day, and repeat liver function in 6-8 weeks.

## 2023-02-20 NOTE — TELEPHONE ENCOUNTER
----- Message from Kristian Barnett MD sent at 2/18/2023  2:01 PM CST -----  Regarding: labs  Low iron /elevated liver functions

## 2023-02-22 ENCOUNTER — PATIENT MESSAGE (OUTPATIENT)
Dept: OBSTETRICS AND GYNECOLOGY | Facility: CLINIC | Age: 43
End: 2023-02-22
Payer: COMMERCIAL

## 2023-02-27 DIAGNOSIS — D64.9 ANEMIA, UNSPECIFIED TYPE: Primary | ICD-10-CM

## 2023-08-29 ENCOUNTER — HOSPITAL ENCOUNTER (OUTPATIENT)
Dept: RADIOLOGY | Facility: HOSPITAL | Age: 43
Discharge: HOME | End: 2023-08-29
Attending: OBSTETRICS & GYNECOLOGY
Payer: COMMERCIAL

## 2023-08-29 ENCOUNTER — PATIENT MESSAGE (OUTPATIENT)
Dept: OBSTETRICS AND GYNECOLOGY | Facility: CLINIC | Age: 43
End: 2023-08-29
Payer: COMMERCIAL

## 2023-08-29 DIAGNOSIS — Z12.31 VISIT FOR SCREENING MAMMOGRAM: ICD-10-CM

## 2023-08-29 DIAGNOSIS — Z12.31 VISIT FOR SCREENING MAMMOGRAM: Primary | ICD-10-CM

## 2023-08-29 PROCEDURE — 77063 BREAST TOMOSYNTHESIS BI: CPT | Mod: 26,,, | Performed by: RADIOLOGY

## 2023-08-29 PROCEDURE — 77067 SCR MAMMO BI INCL CAD: CPT | Mod: 26,,, | Performed by: RADIOLOGY

## 2023-08-29 PROCEDURE — 77067 SCR MAMMO BI INCL CAD: CPT | Mod: TC,PN

## 2023-08-29 PROCEDURE — 77067 MAMMO DIGITAL SCREENING BILAT WITH TOMO: ICD-10-PCS | Mod: 26,,, | Performed by: RADIOLOGY

## 2023-08-29 PROCEDURE — 77063 MAMMO DIGITAL SCREENING BILAT WITH TOMO: ICD-10-PCS | Mod: 26,,, | Performed by: RADIOLOGY

## 2023-08-30 ENCOUNTER — PATIENT MESSAGE (OUTPATIENT)
Dept: OBSTETRICS AND GYNECOLOGY | Facility: CLINIC | Age: 43
End: 2023-08-30
Payer: COMMERCIAL

## 2023-09-05 ENCOUNTER — OFFICE VISIT (OUTPATIENT)
Dept: OBSTETRICS AND GYNECOLOGY | Facility: CLINIC | Age: 43
End: 2023-09-05
Payer: COMMERCIAL

## 2023-09-05 VITALS
BODY MASS INDEX: 21.36 KG/M2 | WEIGHT: 120.56 LBS | SYSTOLIC BLOOD PRESSURE: 118 MMHG | HEIGHT: 63 IN | DIASTOLIC BLOOD PRESSURE: 72 MMHG

## 2023-09-05 DIAGNOSIS — Z01.419 GYNECOLOGIC EXAM NORMAL: Primary | ICD-10-CM

## 2023-09-05 PROCEDURE — 3074F PR MOST RECENT SYSTOLIC BLOOD PRESSURE < 130 MM HG: ICD-10-PCS | Mod: CPTII,S$GLB,, | Performed by: OBSTETRICS & GYNECOLOGY

## 2023-09-05 PROCEDURE — 99396 PREV VISIT EST AGE 40-64: CPT | Mod: S$GLB,,, | Performed by: OBSTETRICS & GYNECOLOGY

## 2023-09-05 PROCEDURE — 99396 PR PREVENTIVE VISIT,EST,40-64: ICD-10-PCS | Mod: S$GLB,,, | Performed by: OBSTETRICS & GYNECOLOGY

## 2023-09-05 PROCEDURE — 3008F PR BODY MASS INDEX (BMI) DOCUMENTED: ICD-10-PCS | Mod: CPTII,S$GLB,, | Performed by: OBSTETRICS & GYNECOLOGY

## 2023-09-05 PROCEDURE — 99999 PR PBB SHADOW E&M-EST. PATIENT-LVL III: ICD-10-PCS | Mod: PBBFAC,,, | Performed by: OBSTETRICS & GYNECOLOGY

## 2023-09-05 PROCEDURE — 3078F DIAST BP <80 MM HG: CPT | Mod: CPTII,S$GLB,, | Performed by: OBSTETRICS & GYNECOLOGY

## 2023-09-05 PROCEDURE — 99999 PR PBB SHADOW E&M-EST. PATIENT-LVL III: CPT | Mod: PBBFAC,,, | Performed by: OBSTETRICS & GYNECOLOGY

## 2023-09-05 PROCEDURE — 3078F PR MOST RECENT DIASTOLIC BLOOD PRESSURE < 80 MM HG: ICD-10-PCS | Mod: CPTII,S$GLB,, | Performed by: OBSTETRICS & GYNECOLOGY

## 2023-09-05 PROCEDURE — 3008F BODY MASS INDEX DOCD: CPT | Mod: CPTII,S$GLB,, | Performed by: OBSTETRICS & GYNECOLOGY

## 2023-09-05 PROCEDURE — 1159F MED LIST DOCD IN RCRD: CPT | Mod: CPTII,S$GLB,, | Performed by: OBSTETRICS & GYNECOLOGY

## 2023-09-05 PROCEDURE — 1159F PR MEDICATION LIST DOCUMENTED IN MEDICAL RECORD: ICD-10-PCS | Mod: CPTII,S$GLB,, | Performed by: OBSTETRICS & GYNECOLOGY

## 2023-09-05 PROCEDURE — 3074F SYST BP LT 130 MM HG: CPT | Mod: CPTII,S$GLB,, | Performed by: OBSTETRICS & GYNECOLOGY

## 2023-09-05 PROCEDURE — 88175 CYTOPATH C/V AUTO FLUID REDO: CPT | Performed by: OBSTETRICS & GYNECOLOGY

## 2023-09-05 RX ORDER — FERROUS SULFATE 325(65) MG
325 TABLET ORAL
COMMUNITY

## 2023-09-05 NOTE — PROGRESS NOTES
Chief Complaint   Patient presents with    Well Woman       History and Physical:  Patient's last menstrual period was 2023.       Kimberly Pearce is a 43 y.o.   female who presents today for her routine annual GYN exam. The patient has no Gynecology complaints today. Heavy emsnes x 7-8 days w clots/flooding - counseled, faield ablation, request hyst with ov conservation      Allergies: Review of patient's allergies indicates:  No Known Allergies    Past Medical History:   Diagnosis Date    Abnormal Pap smear     Abnormal Pap smear of vagina     PONV (postoperative nausea and vomiting)     Scoliosis     Uterine fibroid        Past Surgical History:   Procedure Laterality Date    AUGMENTATION OF BREAST  2009    Breast Lift      BREAST RECONSTRUCTION      Removed implants and performed breast lift    BREAST SURGERY       SECTION      COSMETIC SURGERY  2011    breast augmentation    ENDOMETRIAL ABLATION N/A 2022    Procedure: ABLATION, ENDOMETRIUM;  Surgeon: Kristian Barnett MD;  Location: [facility];  Service: OB/GYN;  Laterality: N/A;    EXCISION OF LESION Left 2022    Procedure: EXCISION, LESION,  from left labia;  Surgeon: Kristian Barnett MD;  Location: [facility];  Service: OB/GYN;  Laterality: Left;    HYSTEROSCOPY WITH DILATION AND CURETTAGE OF UTERUS N/A 2022    Procedure: HYSTEROSCOPY, WITH DILATION AND CURETTAGE OF UTERUS;  Surgeon: Kristian Barnett MD;  Location: [facility];  Service: OB/GYN;  Laterality: N/A;    TONSILLECTOMY      adnoids at 5yo    turnbinate surgery Bilateral        MEDS:   Current Outpatient Medications on File Prior to Visit   Medication Sig Dispense Refill    ACZONE 7.5 % GlwP once daily.      azelastine (ASTELIN) 137 mcg (0.1 %) nasal spray as needed.      ferrous sulfate (FEOSOL) 325 mg (65 mg iron) Tab tablet Take 325 mg by mouth daily with breakfast.      multivitamin capsule Take 1 capsule by mouth once daily.      spironolactone  (ALDACTONE) 50 MG tablet Take 50 mg by mouth once daily.      vitamin D (VITAMIN D3) 1000 units Tab Take 1,000 Units by mouth once daily.      [DISCONTINUED] ADIPEX-P 37.5 mg tablet Take 37.5 mg by mouth.      [DISCONTINUED] montelukast (SINGULAIR) 10 mg tablet Take 10 mg by mouth once daily.      [DISCONTINUED] topiramate (TOPAMAX) 25 MG tablet Take 25 mg by mouth.       Current Facility-Administered Medications on File Prior to Visit   Medication Dose Route Frequency Provider Last Rate Last Admin    albuterol nebulizer solution 2.5 mg  2.5 mg Nebulization Q15 Min PRN Oleg Webster MD        albuterol-ipratropium 2.5 mg-0.5 mg/3 mL nebulizer solution 3 mL  3 mL Nebulization PRN Oleg Webster MD        diphenhydrAMINE injection 25 mg  25 mg Intravenous Q6H PRN Oleg Webster MD        HYDROmorphone injection 0.5 mg  0.5 mg Intravenous Q5 Min PRN Oleg Webster MD        hyoscyamine ODT 0.25 mg  0.25 mg Sublingual Once Oleg Webster MD        ondansetron injection 4 mg  4 mg Intravenous Q15 Min PRN Oleg Webster MD        sodium chloride 0.9% flush 3 mL  3 mL Intravenous Q8H Oleg Webster MD        sodium chloride 0.9% flush 3 mL  3 mL Intravenous PRN Oleg Webster MD           OB History          2    Para   2    Term   1            AB        Living             SAB        IAB        Ectopic        Multiple        Live Births                     Social History     Socioeconomic History    Marital status:    Tobacco Use    Smoking status: Never    Smokeless tobacco: Never   Substance and Sexual Activity    Alcohol use: Yes     Alcohol/week: 4.0 standard drinks of alcohol     Types: 4 Standard drinks or equivalent per week     Comment: casual weekends    Drug use: No    Sexual activity: Yes     Partners: Male     Birth control/protection: OCP       Family History   Problem Relation Age of Onset    Hyperlipidemia Mother     Depression Father     Diabetes Maternal  "Grandmother     Breast cancer Neg Hx     Ovarian cancer Neg Hx          Past medical and surgical history reviewed.   I have reviewed the patient's medical history in detail and updated the computerized patient record.    Review of System:   General: no chills, fever, night sweats, weight gain or weight loss  Psychological: no depression or suicidal ideation  Breasts: no new or changing breast lumps, nipple discharge or masses.  Respiratory: no cough, shortness of breath, or wheezing  Cardiovascular: no chest pain or dyspnea on exertion  Gastrointestinal: no abdominal pain, change in bowel habits, or black or bloody stools  Genito-Urinary: no incontinence, urinary frequency/urgency or vulvar/vaginal symptoms, pelvic pain or abnormal vaginal bleeding.  Musculoskeletal: no gait disturbance or muscular weakness      Physical Exam:   /72   Ht 5' 3" (1.6 m)   Wt 54.7 kg (120 lb 9.5 oz)   LMP 08/24/2023   BMI 21.36 kg/m²   Constitutional: She appears alert and responsive. She appears well-developed, well-groomed, and well-nourished. No distress. Thin   HENT:   Head: Normocephalic and atraumatic.   Eyes: Conjunctivae and EOM are normal. No scleral icterus.   Neck: Symmetrical. Normal range of motion. Neck supple. No tracheal deviation present.   Cardiovascular: Normal rate, no rhythm abnormality noted. Extremities without swelling or edema, warm.    Pulmonary/Chest: Normal respiratory Effort. No distress or retractions. She exhibits no tenderness.  Breasts: are symmetrical.   Right breast exhibits no inverted nipple, no mass, no nipple discharge, no skin change and no tenderness.   Left breast exhibits no inverted nipple, no mass, no nipple discharge, no skin change and no tenderness.  Abdominal: Soft. She exhibits no distension, hernias or masses. There is no tenderness. No enlargement of liver edge or spleen.  There is no rebound and no guarding.   Genitourinary:    External rectal exam shows no thrombosed " external hemorrhoids, no lesions.     Pelvic exam was performed with patient supine.   No labial fusion, and symmetrical.    There is no rash, lesion or injury on the right labia.   There is no rash, lesion or injury on the left labia.   No bleeding and no signs of injury around the vaginal introitus, urethral meatus is normal size and without prolapse or lesions, urethra well supported. The cervix is visualized with no discharge, lesions or friability.   No vaginal discharge found.   No significant Cystocele, Enterocele or rectocele, and cervix and uterus well supported.   Bimanual exam:   The urethra is normal to palpation and there are no palpable vaginal wall masses.   Uterus is not deviated, not enlarged, not fixed, normal shape and not tender.   Cervix exhibits no motion tenderness.    Right adnexum displays no mass or nodularity and no tenderness.   Left adnexum displays no mass or nodularity and no tenderness.  Musculoskeletal: Normal range of motion.   Neurological: She is alert and oriented to person, place, and time. Coordination normal.   Skin: Skin is warm and dry. She is not diaphoretic. No rashes, lesions or ulcers.   Psychiatric: She has a normal mood and affect, oriented to person, place, and time.      Assessment:   Normal annual GYN exam  1. Gynecologic exam normal  Liquid-Based Pap Smear, Screening      Menorrhagia- failed ablation    Plan:   PAP  Mammogram  Schedule Total laparoscopic Hysterectomy at her convenience - menorrhaga  Follow up in 1 year.  Patient informed will be contacted with results within 2 weeks. Encouraged to please call back or email if she has not heard from us by then.

## 2023-09-12 ENCOUNTER — PATIENT MESSAGE (OUTPATIENT)
Dept: OBSTETRICS AND GYNECOLOGY | Facility: CLINIC | Age: 43
End: 2023-09-12
Payer: COMMERCIAL

## 2024-05-06 ENCOUNTER — E-VISIT (OUTPATIENT)
Dept: OBSTETRICS AND GYNECOLOGY | Facility: CLINIC | Age: 44
End: 2024-05-06
Payer: COMMERCIAL

## 2024-05-06 DIAGNOSIS — N92.1 MENORRHAGIA WITH IRREGULAR CYCLE: Primary | ICD-10-CM

## 2024-05-06 PROCEDURE — 99421 OL DIG E/M SVC 5-10 MIN: CPT | Mod: ,,, | Performed by: OBSTETRICS & GYNECOLOGY

## 2024-05-06 RX ORDER — NORETHINDRONE ACETATE AND ETHINYL ESTRADIOL .03; 1.5 MG/1; MG/1
1 TABLET ORAL DAILY
Qty: 90 EACH | Refills: 1 | Status: SHIPPED | OUTPATIENT
Start: 2024-05-06 | End: 2025-05-06

## 2024-05-06 NOTE — PROGRESS NOTES
Patient requested information on5/6/2024  Patient agreed to term of Kathieisit  Chief Complaint: reuest restart oral contraceptive pills   Total time spent over the 7 day period:  More than 5 minutes ([id number]  - reviewing initial message, patient prior visits/records/labs pertaining to this visit  - reviewing intervention and contact related to this problem by other clinic staff  - developed plan / prescription / ordering tests   - subsequent communication with patient related to problem    Summary:   Not his risk for oral contraceptive pills complications  Using to bridge heavy cycles until ready for hysterectomy    Assessment:menorrhagia / need to delay hysterectomy a bit    PLAN:  Loestrin 1.5/30  Followup as needed

## 2024-07-04 ENCOUNTER — PATIENT MESSAGE (OUTPATIENT)
Dept: OBSTETRICS AND GYNECOLOGY | Facility: CLINIC | Age: 44
End: 2024-07-04
Payer: COMMERCIAL

## 2024-07-06 RX ORDER — ETHYNODIOL DIACETATE AND ETHINYL ESTRADIOL 1 MG-35MCG
1 KIT ORAL DAILY
Qty: 30 TABLET | Refills: 6 | Status: SHIPPED | OUTPATIENT
Start: 2024-07-06 | End: 2025-07-06

## 2024-07-17 ENCOUNTER — TELEPHONE (OUTPATIENT)
Dept: OBSTETRICS AND GYNECOLOGY | Facility: CLINIC | Age: 44
End: 2024-07-17
Payer: COMMERCIAL

## 2024-07-17 ENCOUNTER — OFFICE VISIT (OUTPATIENT)
Dept: OBSTETRICS AND GYNECOLOGY | Facility: CLINIC | Age: 44
End: 2024-07-17
Payer: COMMERCIAL

## 2024-07-17 VITALS
BODY MASS INDEX: 21.61 KG/M2 | SYSTOLIC BLOOD PRESSURE: 140 MMHG | DIASTOLIC BLOOD PRESSURE: 80 MMHG | WEIGHT: 121.94 LBS | HEIGHT: 63 IN

## 2024-07-17 DIAGNOSIS — Z12.4 CERVICAL CANCER SCREENING: Primary | ICD-10-CM

## 2024-07-17 DIAGNOSIS — Z12.31 VISIT FOR SCREENING MAMMOGRAM: ICD-10-CM

## 2024-07-17 PROCEDURE — 3008F BODY MASS INDEX DOCD: CPT | Mod: CPTII,S$GLB,, | Performed by: OBSTETRICS & GYNECOLOGY

## 2024-07-17 PROCEDURE — 88175 CYTOPATH C/V AUTO FLUID REDO: CPT | Performed by: OBSTETRICS & GYNECOLOGY

## 2024-07-17 PROCEDURE — 3077F SYST BP >= 140 MM HG: CPT | Mod: CPTII,S$GLB,, | Performed by: OBSTETRICS & GYNECOLOGY

## 2024-07-17 PROCEDURE — 3079F DIAST BP 80-89 MM HG: CPT | Mod: CPTII,S$GLB,, | Performed by: OBSTETRICS & GYNECOLOGY

## 2024-07-17 PROCEDURE — 99396 PREV VISIT EST AGE 40-64: CPT | Mod: S$GLB,,, | Performed by: OBSTETRICS & GYNECOLOGY

## 2024-07-17 PROCEDURE — 1159F MED LIST DOCD IN RCRD: CPT | Mod: CPTII,S$GLB,, | Performed by: OBSTETRICS & GYNECOLOGY

## 2024-07-17 PROCEDURE — 99999 PR PBB SHADOW E&M-EST. PATIENT-LVL III: CPT | Mod: PBBFAC,,, | Performed by: OBSTETRICS & GYNECOLOGY

## 2024-07-17 NOTE — TELEPHONE ENCOUNTER
Spoke with patient regarding annual appt on today. Informed too soon for annual per insurance guidelines, need to be a year and a day for approval. Patient states she doesn't care and will pay out of pocket if she needs to.

## 2024-07-17 NOTE — PROGRESS NOTES
Chief Complaint   Patient presents with    Well Woman     Pt stated that she has been bleeding for over a month and every time she bleeds she's in pain. She recently switch to [place].       History and Physical:  No LMP recorded.       Kimberly Pearce is a 44 y.o.   female who presents today for her routine annual GYN exam. The patient has no Gynecology complaints today. Continued menses heavy and prolonged, endometrial ablation failed, uterine fibroids, interested in definitive surgery, counseled on Risks, Benefits and Alternatives to Total laparoscopic Hysterectomy w ovarian conservation, discused with patient in detail, all questions answered and patient agreed to proceed.        Allergies: Review of patient's allergies indicates:  No Known Allergies    Past Medical History:   Diagnosis Date    Abnormal Pap smear     Abnormal Pap smear of vagina     PONV (postoperative nausea and vomiting)     Scoliosis     Uterine fibroid        Past Surgical History:   Procedure Laterality Date    AUGMENTATION OF BREAST      Breast Lift      BREAST RECONSTRUCTION      Removed implants and performed breast lift    BREAST SURGERY       SECTION      COSMETIC SURGERY  2011    breast augmentation    ENDOMETRIAL ABLATION N/A 2022    Procedure: ABLATION, ENDOMETRIUM;  Surgeon: Kristian Barnett MD;  Location: [facility];  Service: OB/GYN;  Laterality: N/A;    EXCISION OF LESION Left 2022    Procedure: EXCISION, LESION,  from left labia;  Surgeon: Kristian Barnett MD;  Location: [facility];  Service: OB/GYN;  Laterality: Left;    HYSTEROSCOPY WITH DILATION AND CURETTAGE OF UTERUS N/A 2022    Procedure: HYSTEROSCOPY, WITH DILATION AND CURETTAGE OF UTERUS;  Surgeon: Kristian Barnett MD;  Location: [facility];  Service: OB/GYN;  Laterality: N/A;    [facility]        MEDS:   Current Outpatient Medications on File Prior to Visit    Medication Sig Dispense Refill    ACZONE 7.5 % GlwP once daily.      azelastine (ASTELIN) 137 mcg (0.1 %) nasal spray as needed.      ethynodiol-ethinyl estradiol (KELNOR) 1-35 mg-mcg per tablet Take 1 tablet by mouth once daily. 30 tablet 6    ferrous sulfate (FEOSOL) 325 mg (65 mg iron) Tab tablet Take 325 mg by mouth daily with breakfast.      multivitamin capsule Take 1 capsule by mouth once daily.      spironolactone (ALDACTONE) 50 MG tablet Take 50 mg by mouth once daily.      vitamin D (VITAMIN D3) 1000 units Tab Take 1,000 Units by mouth once daily.       Current Facility-Administered Medications on File Prior to Visit   Medication Dose Route Frequency Provider Last Rate Last Admin    albuterol nebulizer solution 2.5 mg  2.5 mg Nebulization Q15 Min PRN Oleg Webster MD        albuterol-ipratropium 2.5 mg-0.5 mg/3 mL nebulizer solution 3 mL  3 mL Nebulization PRN Oleg Webster MD        diphenhydrAMINE injection 25 mg  25 mg Intravenous Q6H PRN Oleg Webster MD        HYDROmorphone injection 0.5 mg  0.5 mg Intravenous Q5 Min PRN Oleg Webster MD        hyoscyamine ODT 0.25 mg  0.25 mg Sublingual Once Oleg Webster MD        ondansetron injection 4 mg  4 mg Intravenous Q15 Min PRN Oleg Webster MD        sodium chloride 0.9% flush 3 mL  3 mL Intravenous Q8H Oleg Webster MD        sodium chloride 0.9% flush 3 mL  3 mL Intravenous PRN Oleg Webster MD           OB History          2    Para   2    Term   1            AB        Living             SAB        IAB        Ectopic        Multiple        Live Births                     Social History     Socioeconomic History    Marital status:    Tobacco Use    Smoking status: Never    Smokeless tobacco: Never   Substance and Sexual Activity    Alcohol use: Yes     Alcohol/week: 4.0 standard drinks of alcohol     Types: 4 Standard drinks or equivalent per week     Comment: casual weekends    Drug use: No     "Sexual activity: Yes     Partners: Male     Birth control/protection: OCP       Family History   Problem Relation Name Age of Onset    Hyperlipidemia Mother      Depression Father      Diabetes Maternal Grandmother      Breast cancer Neg Hx      Ovarian cancer Neg Hx           Past medical and surgical history reviewed.   I have reviewed the patient's medical history in detail and updated the computerized patient record.        Review of System:  General: no chills, fever, night sweats, weight gain or weight loss  Psychological: no depression or suicidal ideation  Breasts: no new or changing breast lumps, nipple discharge or masses.  Respiratory: no cough, shortness of breath, or wheezing  Cardiovascular: no chest pain or dyspnea on exertion  Gastrointestinal: no abdominal pain, change in bowel habits, or black or bloody stools  Genito-Urinary: no incontinence, urinary frequency/urgency or vulvar/vaginal symptoms  Musculoskeletal: no gait disturbance or muscular weakness      Physical Exam:   BP (!) 140/80   Ht 5' 3" (1.6 m)   BMI 21.36 kg/m²   Constitutional: She appears alert and responsive. She appears well-developed, well-groomed, and well-nourished. No distress. Thin  HENT:   Head: Normocephalic and atraumatic.   Eyes: Conjunctivae and EOM are normal. No scleral icterus.   Neck: Symmetrical. Normal range of motion. Neck supple. No tracheal deviation present.   Cardiovascular: Normal rate, no rhythm abnormality noted. Extremities without swelling or edema, warm.    Pulmonary/Chest: Normal respiratory Effort. No distress or retractions. She exhibits no tenderness.  Breasts: are symmetrical. Well healed lift scars.   Right breast exhibits no inverted nipple, no mass, no nipple discharge, no skin change and no tenderness.   Left breast exhibits no inverted nipple, no mass, no nipple discharge, no skin change and no tenderness.  Abdominal: Soft. She exhibits no distension, hernias or masses. There is no " tenderness. No enlargement of liver edge or spleen.  There is no rebound and no guarding.   Genitourinary:    External rectal exam shows no thrombosed external hemorrhoids, no lesions.     Pelvic exam was performed with patient supine.   No labial fusion, and symmetrical.    There is no rash, lesion or injury on the right labia.   There is no rash, lesion or injury on the left labia.   No bleeding and no signs of injury around the vaginal introitus, urethral meatus is normal size and without prolapse or lesions, urethra well supported. The cervix is visualized with no discharge, lesions or friability.   No vaginal discharge found.    No significant Cystocele, Enterocele or rectocele, and cervix and uterus well supported.   Bimanual exam:   The urethra is normal to palpation and there are no palpable vaginal wall masses.   Uterus is not deviated, not enlarged, not fixed, normal shape and not tender.   Cervix exhibits no motion tenderness.    Right adnexum displays no mass or nodularity and no tenderness.   Left adnexum displays no mass or nodularity and no tenderness.  Musculoskeletal: Normal range of motion.   Neurological: She is alert and oriented to person, place, and time. Coordination normal.   Skin: Skin is warm and dry. She is not diaphoretic. No rashes, lesions or ulcers.   Psychiatric: She has a normal mood and affect, oriented to person, place, and time.      Assessment:   Normal annual GYN exam  1. Cervical cancer screening  Liquid-Based Pap Smear, Screening      Fibroids w menorrhagia, failed endometrial ablation    Plan:   PAP  Mammogram 9/2024  Schedule for Total laparoscopic Hysterectomy with ovarian conservation  Follow up in 1 years.  Patient informed will be contacted with results within 2 weeks. Encouraged to please call back or email if she has not heard from us by then.

## 2024-07-22 LAB
CLINICAL INFO: NORMAL
CYTO CVX: NORMAL
CYTOLOGIST CVX/VAG CYTO: NORMAL
CYTOLOGIST CVX/VAG CYTO: NORMAL
CYTOLOGY CMNT CVX/VAG CYTO-IMP: NORMAL
CYTOLOGY PAP THIN PREP EXPLANATION: NORMAL
DATE OF PREVIOUS PAP: NORMAL
DATE PREVIOUS BX: NORMAL
GEN CATEG CVX/VAG CYTO-IMP: NORMAL
LMP START DATE: NORMAL
MICROORGANISM CVX/VAG CYTO: NORMAL
PATHOLOGIST CVX/VAG CYTO: NORMAL
SERVICE CMNT-IMP: NORMAL
SPECIMEN SOURCE CVX/VAG CYTO: NORMAL
STAT OF ADQ CVX/VAG CYTO-IMP: NORMAL

## 2024-07-23 ENCOUNTER — PATIENT MESSAGE (OUTPATIENT)
Dept: OBSTETRICS AND GYNECOLOGY | Facility: CLINIC | Age: 44
End: 2024-07-23
Payer: COMMERCIAL

## 2024-07-29 DIAGNOSIS — D25.9 UTERINE LEIOMYOMA, UNSPECIFIED LOCATION: Primary | ICD-10-CM

## 2024-07-29 DIAGNOSIS — N92.0 MENORRHAGIA WITH REGULAR CYCLE: ICD-10-CM

## 2024-08-02 ENCOUNTER — PATIENT MESSAGE (OUTPATIENT)
Dept: OBSTETRICS AND GYNECOLOGY | Facility: CLINIC | Age: 44
End: 2024-08-02
Payer: COMMERCIAL

## 2024-09-04 ENCOUNTER — OFFICE VISIT (OUTPATIENT)
Dept: OBSTETRICS AND GYNECOLOGY | Facility: CLINIC | Age: 44
End: 2024-09-04
Payer: COMMERCIAL

## 2024-09-04 VITALS
WEIGHT: 124.56 LBS | SYSTOLIC BLOOD PRESSURE: 132 MMHG | BODY MASS INDEX: 22.06 KG/M2 | DIASTOLIC BLOOD PRESSURE: 98 MMHG

## 2024-09-04 DIAGNOSIS — N92.0 MENORRHAGIA: ICD-10-CM

## 2024-09-04 DIAGNOSIS — N92.0 MENORRHAGIA WITH REGULAR CYCLE: Primary | ICD-10-CM

## 2024-09-04 PROCEDURE — 3008F BODY MASS INDEX DOCD: CPT | Mod: CPTII,S$GLB,, | Performed by: OBSTETRICS & GYNECOLOGY

## 2024-09-04 PROCEDURE — 3075F SYST BP GE 130 - 139MM HG: CPT | Mod: CPTII,S$GLB,, | Performed by: OBSTETRICS & GYNECOLOGY

## 2024-09-04 PROCEDURE — 1159F MED LIST DOCD IN RCRD: CPT | Mod: CPTII,S$GLB,, | Performed by: OBSTETRICS & GYNECOLOGY

## 2024-09-04 PROCEDURE — 99213 OFFICE O/P EST LOW 20 MIN: CPT | Mod: 57,S$GLB,, | Performed by: OBSTETRICS & GYNECOLOGY

## 2024-09-04 PROCEDURE — 3080F DIAST BP >= 90 MM HG: CPT | Mod: CPTII,S$GLB,, | Performed by: OBSTETRICS & GYNECOLOGY

## 2024-09-04 PROCEDURE — 99999 PR PBB SHADOW E&M-EST. PATIENT-LVL III: CPT | Mod: PBBFAC,,, | Performed by: OBSTETRICS & GYNECOLOGY

## 2024-09-04 RX ORDER — CEFAZOLIN SODIUM 2 G/50ML
2 SOLUTION INTRAVENOUS
Status: CANCELLED | OUTPATIENT
Start: 2024-09-04

## 2024-09-04 RX ORDER — MUPIROCIN 20 MG/G
OINTMENT TOPICAL
Status: CANCELLED | OUTPATIENT
Start: 2024-09-04

## 2024-09-04 RX ORDER — SODIUM CHLORIDE 9 MG/ML
INJECTION, SOLUTION INTRAVENOUS CONTINUOUS
Status: CANCELLED | OUTPATIENT
Start: 2024-09-04

## 2024-09-04 NOTE — PROGRESS NOTES
Chief Complaint   Patient presents with    Well Woman       Pt stated that she has been bleeding for over a month and every time she bleeds she's in pain. She recently switch to [place].         History and Physical:  No LMP recorded.         Courtney Pearce is a 44 y.o.   female who presents today for her routine annual GYN exam. The patient has no Gynecology complaints today. Continued menses heavy and prolonged, endometrial ablation failed, uterine fibroids, interested in definitive surgery, counseled on Risks, Benefits and Alternatives to Total laparoscopic Hysterectomy w ovarian conservation, discused with patient in detail, all questions answered and patient agreed to proceed.          Allergies: Review of patient's allergies indicates:  No Known Allergies          Past Medical History:   Diagnosis Date    Abnormal Pap smear      Abnormal Pap smear of vagina      PONV (postoperative nausea and vomiting)      Scoliosis      Uterine fibroid                 Past Surgical History:   Procedure Laterality Date    AUGMENTATION OF BREAST       Breast Lift        BREAST RECONSTRUCTION        Removed implants and performed breast lift    BREAST SURGERY         SECTION        COSMETIC SURGERY   2011     breast augmentation    ENDOMETRIAL ABLATION N/A 2022     Procedure: ABLATION, ENDOMETRIUM;  Surgeon: Kristian Barnett MD;  Location: [facility];  Service: OB/GYN;  Laterality: N/A;    EXCISION OF LESION Left 2022     Procedure: EXCISION, LESION,  from left labia;  Surgeon: Kristian Barnett MD;  Location: [facility];  Service: OB/GYN;  Laterality: Left;    HYSTEROSCOPY WITH DILATION AND CURETTAGE OF UTERUS N/A 2022     Procedure: HYSTEROSCOPY, WITH DILATION AND CURETTAGE OF UTERUS;  Surgeon: Kristian Barnett MD;  Location: [facility];  Service: OB/GYN;  Laterality: N/A;    [facility]           MEDS:    Medications Ordered Prior to Encounter          Current Outpatient Medications on File Prior to Visit   Medication Sig Dispense Refill    ACZONE 7.5 % GlwP once daily.        azelastine (ASTELIN) 137 mcg (0.1 %) nasal spray as needed.        ethynodiol-ethinyl estradiol (KELNOR) 1-35 mg-mcg per tablet Take 1 tablet by mouth once daily. 30 tablet 6    ferrous sulfate (FEOSOL) 325 mg (65 mg iron) Tab tablet Take 325 mg by mouth daily with breakfast.        multivitamin capsule Take 1 capsule by mouth once daily.        spironolactone (ALDACTONE) 50 MG tablet Take 50 mg by mouth once daily.        vitamin D (VITAMIN D3) 1000 units Tab Take 1,000 Units by mouth once daily.                    Current Facility-Administered Medications on File Prior to Visit   Medication Dose Route Frequency Provider Last Rate Last Admin    albuterol nebulizer solution 2.5 mg  2.5 mg Nebulization Q15 Min PRN Oleg Webster MD        albuterol-ipratropium 2.5 mg-0.5 mg/3 mL nebulizer solution 3 mL  3 mL Nebulization PRN Oleg Webster MD        diphenhydrAMINE injection 25 mg  25 mg Intravenous Q6H PRN Oleg Webster MD        HYDROmorphone injection 0.5 mg  0.5 mg Intravenous Q5 Min PRN Oleg Webster MD        hyoscyamine ODT 0.25 mg  0.25 mg Sublingual Once Oleg Webster MD        ondansetron injection 4 mg  4 mg Intravenous Q15 Min PRN Oleg Webster MD        sodium chloride 0.9% flush 3 mL  3 mL Intravenous Q8H Oleg Webster MD        sodium chloride 0.9% flush 3 mL  3 mL Intravenous PRN Oleg Webster MD                OB History            2    Para   2    Term   1            AB        Living               SAB        IAB        Ectopic        Multiple        Live Births                         Social History            Socioeconomic History    Marital status:    Tobacco Use    Smoking status: Never    Smokeless tobacco: Never   Substance and Sexual Activity    Alcohol use: Yes        "Alcohol/week: 4.0 standard drinks of alcohol       Types: 4 Standard drinks or equivalent per week       Comment: casual weekends    Drug use: No    Sexual activity: Yes       Partners: Male       Birth control/protection: OCP                Family History   Problem Relation Name Age of Onset    Hyperlipidemia Mother        Depression Father        Diabetes Maternal Grandmother        Breast cancer Neg Hx        Ovarian cancer Neg Hx                Past medical and surgical history reviewed.   I have reviewed the patient's medical history in detail and updated the computerized patient record.           Review of System:  General: no chills, fever, night sweats, weight gain or weight loss  Psychological: no depression or suicidal ideation  Breasts: no new or changing breast lumps, nipple discharge or masses.  Respiratory: no cough, shortness of breath, or wheezing  Cardiovascular: no chest pain or dyspnea on exertion  Gastrointestinal: no abdominal pain, change in bowel habits, or black or bloody stools  Genito-Urinary: no incontinence, urinary frequency/urgency or vulvar/vaginal symptoms  Musculoskeletal: no gait disturbance or muscular weakness        Physical Exam:   BP (!) 140/80   Ht 5' 3" (1.6 m)   BMI 21.36 kg/m²   Constitutional: She appears alert and responsive. She appears well-developed, well-groomed, and well-nourished. No distress. Thin  HENT:   Head: Normocephalic and atraumatic.   Eyes: Conjunctivae and EOM are normal. No scleral icterus.   Neck: Symmetrical. Normal range of motion. Neck supple. No tracheal deviation present.   Cardiovascular: Normal rate, no rhythm abnormality noted. Extremities without swelling or edema, warm.    Pulmonary/Chest: Normal respiratory Effort. No distress or retractions. She exhibits no tenderness.  Breasts: are symmetrical. Well healed lift scars.   Right breast exhibits no inverted nipple, no mass, no nipple discharge, no skin change and no tenderness.   Left breast " exhibits no inverted nipple, no mass, no nipple discharge, no skin change and no tenderness.  Abdominal: Soft. She exhibits no distension, hernias or masses. There is no tenderness. No enlargement of liver edge or spleen.  There is no rebound and no guarding.   Genitourinary:    External rectal exam shows no thrombosed external hemorrhoids, no lesions.     Pelvic exam was performed with patient supine.   No labial fusion, and symmetrical.    There is no rash, lesion or injury on the right labia.   There is no rash, lesion or injury on the left labia.   No bleeding and no signs of injury around the vaginal introitus, urethral meatus is normal size and without prolapse or lesions, urethra well supported. The cervix is visualized with no discharge, lesions or friability.   No vaginal discharge found.    No significant Cystocele, Enterocele or rectocele, and cervix and uterus well supported.   Bimanual exam:   The urethra is normal to palpation and there are no palpable vaginal wall masses.   Uterus is not deviated, not enlarged, not fixed, normal shape and not tender.   Cervix exhibits no motion tenderness.    Right adnexum displays no mass or nodularity and no tenderness.   Left adnexum displays no mass or nodularity and no tenderness.  Musculoskeletal: Normal range of motion.   Neurological: She is alert and oriented to person, place, and time. Coordination normal.   Skin: Skin is warm and dry. She is not diaphoretic. No rashes, lesions or ulcers.   Psychiatric: She has a normal mood and affect, oriented to person, place, and time.        Assessment:   Normal annual GYN exam  1. Cervical cancer screening  Liquid-Based Pap Smear, Screening       Fibroids w menorrhagia, failed endometrial ablation     Plan:   PAP  Mammogram 9/2024  Schedule for Total laparoscopic Hysterectomy with ovarian conservation

## 2024-09-04 NOTE — H&P
ADMISSION H&P:  2024         Chief Complaint   Patient presents with    Well Woman       Pt stated that she has been bleeding for over a month and every time she bleeds she's in pain. She recently switch to [place].         History and Physical:  No LMP recorded.         Courtney Pearce is a 44 y.o.   female who presents today for her routine annual GYN exam. The patient has no Gynecology complaints today. Continued menses heavy and prolonged, endometrial ablation failed, uterine fibroids, interested in definitive surgery, counseled on Risks, Benefits and Alternatives to Total laparoscopic Hysterectomy w ovarian conservation, discused with patient in detail, all questions answered and patient agreed to proceed.          Allergies: Review of patient's allergies indicates:  No Known Allergies          Past Medical History:   Diagnosis Date    Abnormal Pap smear      Abnormal Pap smear of vagina      PONV (postoperative nausea and vomiting)      Scoliosis      Uterine fibroid                 Past Surgical History:   Procedure Laterality Date    AUGMENTATION OF BREAST       Breast Lift        BREAST RECONSTRUCTION        Removed implants and performed breast lift    BREAST SURGERY         SECTION        COSMETIC SURGERY   2011     breast augmentation    ENDOMETRIAL ABLATION N/A 2022     Procedure: ABLATION, ENDOMETRIUM;  Surgeon: Kristian Barnett MD;  Location: [facility];  Service: OB/GYN;  Laterality: N/A;    EXCISION OF LESION Left 2022     Procedure: EXCISION, LESION,  from left labia;  Surgeon: Kristian Barnett MD;  Location: [facility];  Service: OB/GYN;  Laterality: Left;    HYSTEROSCOPY WITH DILATION AND CURETTAGE OF UTERUS N/A 2022     Procedure: HYSTEROSCOPY, WITH DILATION AND CURETTAGE OF UTERUS;  Surgeon: Kristian Barnett MD;  Location: [facility];  Service: OB/GYN;  Laterality: N/A;    TONSILLECTOMY         adnoids at 3yo    turn[facility]  surgery Bilateral           MEDS:   Medications Ordered Prior to Encounter          Current Outpatient Medications on File Prior to Visit   Medication Sig Dispense Refill    ACZONE 7.5 % GlwP once daily.        azelastine (ASTELIN) 137 mcg (0.1 %) nasal spray as needed.        ethynodiol-ethinyl estradiol (KELNOR) 1-35 mg-mcg per tablet Take 1 tablet by mouth once daily. 30 tablet 6    ferrous sulfate (FEOSOL) 325 mg (65 mg iron) Tab tablet Take 325 mg by mouth daily with breakfast.        multivitamin capsule Take 1 capsule by mouth once daily.        spironolactone (ALDACTONE) 50 MG tablet Take 50 mg by mouth once daily.        vitamin D (VITAMIN D3) 1000 units Tab Take 1,000 Units by mouth once daily.                    Current Facility-Administered Medications on File Prior to Visit   Medication Dose Route Frequency Provider Last Rate Last Admin    albuterol nebulizer solution 2.5 mg  2.5 mg Nebulization Q15 Min PRN Oleg Webster MD        albuterol-ipratropium 2.5 mg-0.5 mg/3 mL nebulizer solution 3 mL  3 mL Nebulization PRN Oleg Webster MD        diphenhydrAMINE injection 25 mg  25 mg Intravenous Q6H PRN Oleg Webster MD        HYDROmorphone injection 0.5 mg  0.5 mg Intravenous Q5 Min PRN Oleg Webster MD        hyoscyamine ODT 0.25 mg  0.25 mg Sublingual Once Oleg Webster MD        ondansetron injection 4 mg  4 mg Intravenous Q15 Min PRN Oleg Webster MD        sodium chloride 0.9% flush 3 mL  3 mL Intravenous Q8H Oleg Webster MD        sodium chloride 0.9% flush 3 mL  3 mL Intravenous PRN Oleg Webster MD                OB History            2    Para   2    Term   1            AB        Living               SAB        IAB        Ectopic        Multiple        Live Births                         Social History            Socioeconomic History    Marital status:    Tobacco Use    Smoking status: Never    Smokeless tobacco: Never   Substance and Sexual  "Activity    Alcohol use: Yes       Alcohol/week: 4.0 standard drinks of alcohol       Types: 4 Standard drinks or equivalent per week       Comment: casual weekends    Drug use: No    Sexual activity: Yes       Partners: Male       Birth control/protection: OCP                Family History   Problem Relation Name Age of Onset    Hyperlipidemia Mother        Depression Father        Diabetes Maternal Grandmother        Breast cancer Neg Hx        Ovarian cancer Neg Hx                Past medical and surgical history reviewed.   I have reviewed the patient's medical history in detail and updated the computerized patient record.           Review of System:  General: no chills, fever, night sweats, weight gain or weight loss  Psychological: no depression or suicidal ideation  Breasts: no new or changing breast lumps, nipple discharge or masses.  Respiratory: no cough, shortness of breath, or wheezing  Cardiovascular: no chest pain or dyspnea on exertion  Gastrointestinal: no abdominal pain, change in bowel habits, or black or bloody stools  Genito-Urinary: no incontinence, urinary frequency/urgency or vulvar/vaginal symptoms  Musculoskeletal: no gait disturbance or muscular weakness        Physical Exam:   BP (!) 140/80   Ht 5' 3" (1.6 m)   BMI 21.36 kg/m²   Constitutional: She appears alert and responsive. She appears well-developed, well-groomed, and well-nourished. No distress. Thin  HENT:   Head: Normocephalic and atraumatic.   Eyes: Conjunctivae and EOM are normal. No scleral icterus.   Neck: Symmetrical. Normal range of motion. Neck supple. No tracheal deviation present.   Cardiovascular: Normal rate, no rhythm abnormality noted. Extremities without swelling or edema, warm.    Pulmonary/Chest: Normal respiratory Effort. No distress or retractions. She exhibits no tenderness.  Breasts: are symmetrical. Well healed lift scars.   Right breast exhibits no inverted nipple, no mass, no nipple discharge, no skin " change and no tenderness.   Left breast exhibits no inverted nipple, no mass, no nipple discharge, no skin change and no tenderness.  Abdominal: Soft. She exhibits no distension, hernias or masses. There is no tenderness. No enlargement of liver edge or spleen.  There is no rebound and no guarding.   Genitourinary:    External rectal exam shows no thrombosed external hemorrhoids, no lesions.     Pelvic exam was performed with patient supine.   No labial fusion, and symmetrical.    There is no rash, lesion or injury on the right labia.   There is no rash, lesion or injury on the left labia.   No bleeding and no signs of injury around the vaginal introitus, urethral meatus is normal size and without prolapse or lesions, urethra well supported. The cervix is visualized with no discharge, lesions or friability.   No vaginal discharge found.    No significant Cystocele, Enterocele or rectocele, and cervix and uterus well supported.   Bimanual exam:   The urethra is normal to palpation and there are no palpable vaginal wall masses.   Uterus is not deviated, not enlarged, not fixed, normal shape and not tender.   Cervix exhibits no motion tenderness.    Right adnexum displays no mass or nodularity and no tenderness.   Left adnexum displays no mass or nodularity and no tenderness.  Musculoskeletal: Normal range of motion.   Neurological: She is alert and oriented to person, place, and time. Coordination normal.   Skin: Skin is warm and dry. She is not diaphoretic. No rashes, lesions or ulcers.   Psychiatric: She has a normal mood and affect, oriented to person, place, and time.        Assessment:   Normal annual GYN exam  1. Cervical cancer screening  Liquid-Based Pap Smear, Screening       Fibroids w menorrhagia, failed endometrial ablation     Plan:   PAP  Mammogram 9/2024  Schedule for Total laparoscopic Hysterectomy with ovarian conservation        Kristian Barnett M.D., FACOG

## 2024-09-09 ENCOUNTER — PATIENT MESSAGE (OUTPATIENT)
Dept: OBSTETRICS AND GYNECOLOGY | Facility: CLINIC | Age: 44
End: 2024-09-09
Payer: COMMERCIAL

## 2024-09-23 ENCOUNTER — PATIENT MESSAGE (OUTPATIENT)
Dept: OBSTETRICS AND GYNECOLOGY | Facility: CLINIC | Age: 44
End: 2024-09-23

## 2024-09-23 ENCOUNTER — HOSPITAL ENCOUNTER (OUTPATIENT)
Dept: RADIOLOGY | Facility: HOSPITAL | Age: 44
Discharge: HOME | End: 2024-09-23
Attending: OBSTETRICS & GYNECOLOGY
Payer: COMMERCIAL

## 2024-09-23 ENCOUNTER — OFFICE VISIT (OUTPATIENT)
Dept: OBSTETRICS AND GYNECOLOGY | Facility: CLINIC | Age: 44
End: 2024-09-23
Payer: COMMERCIAL

## 2024-09-23 VITALS
BODY MASS INDEX: 21.99 KG/M2 | WEIGHT: 124.13 LBS | RESPIRATION RATE: 18 BRPM | SYSTOLIC BLOOD PRESSURE: 132 MMHG | DIASTOLIC BLOOD PRESSURE: 76 MMHG | HEIGHT: 63 IN

## 2024-09-23 DIAGNOSIS — Z12.31 BREAST CANCER SCREENING BY MAMMOGRAM: Primary | ICD-10-CM

## 2024-09-23 DIAGNOSIS — Z12.31 BREAST CANCER SCREENING BY MAMMOGRAM: ICD-10-CM

## 2024-09-23 PROCEDURE — 77067 SCR MAMMO BI INCL CAD: CPT | Mod: TC,PN

## 2024-09-23 PROCEDURE — 77067 SCR MAMMO BI INCL CAD: CPT | Mod: 26,,, | Performed by: RADIOLOGY

## 2024-09-23 PROCEDURE — 3075F SYST BP GE 130 - 139MM HG: CPT | Mod: CPTII,S$GLB,, | Performed by: OBSTETRICS & GYNECOLOGY

## 2024-09-23 PROCEDURE — 1159F MED LIST DOCD IN RCRD: CPT | Mod: CPTII,S$GLB,, | Performed by: OBSTETRICS & GYNECOLOGY

## 2024-09-23 PROCEDURE — 99024 POSTOP FOLLOW-UP VISIT: CPT | Mod: S$GLB,,, | Performed by: OBSTETRICS & GYNECOLOGY

## 2024-09-23 PROCEDURE — 99999 PR PBB SHADOW E&M-EST. PATIENT-LVL IV: CPT | Mod: PBBFAC,,, | Performed by: OBSTETRICS & GYNECOLOGY

## 2024-09-23 PROCEDURE — 3078F DIAST BP <80 MM HG: CPT | Mod: CPTII,S$GLB,, | Performed by: OBSTETRICS & GYNECOLOGY

## 2024-09-23 PROCEDURE — 77063 BREAST TOMOSYNTHESIS BI: CPT | Mod: 26,,, | Performed by: RADIOLOGY

## 2024-09-23 NOTE — PROGRESS NOTES
History of Present Illness:   Pateint presents today  2 weeks postop, status post Total laparoscopic Hysterectomy , with complaint of NONE.    Pathology:   [facility] 9/5/2024:  UTERUS AND BILATERAL FALLOPIAN TUBES, HYSTERECTOMY AND BILATERAL    SALPINGECTOMY   - CERVIX WITH SQUAMOUS METAPLASIA AND CHRONIC CERVICITIS   - INACTIVE ENDOMETRIUM   - MYOMETRIUM WITH LEIOMYOMAS   - UTERINE SEROSA WITH NO SIGNIFICANT HISTOPATHOLOGIC FINDINGS   - BILATERAL FALLOPIAN TUBES WITH NO SIGNIFICANT HISTOPATHOLOGIC FINDINGS     Past medical and surgical history reviewed.   I have reviewed the patient's medical history in detail and updated the computerized patient record.    Physical exam:  Vital Signs: as above, reviewed.  Constitutional: She is oriented to person, place, and time. She appears well-developed and well-nourished. No distress.   HENT:   Head: Normocephalic and atraumatic.   Eyes: Conjunctivae and EOM are normal. No scleral icterus.   Neck: Normal range of motion. Neck supple. No tracheal deviation present.   Cardiovascular: Normal rate.    Pulmonary/Chest: Effort normal. No respiratory distress. She exhibits no tenderness.  Breasts: deferred  Abdominal: Soft. She exhibits no distension and no mass.  The incisions are healing well. There is no rebound and no guarding.   Genitourinary: Deferred  Musculoskeletal: Normal range of motion.   Lymphadenopathy: No inguinal adenopathy present.   Neurological: She is alert and oriented to person, place, and time. Coordination normal.   Skin: Skin is warm and dry. She is not diaphoretic.   Psychiatric: She has a normal mood and affect.    Assessment:  NORMAL 2 WEEK POSTOP    Plan:  Follow up  4 weeks  SCREENING MAMMOGRAM DUE

## 2024-10-03 NOTE — ADDENDUM NOTE
Addended by: NIEVES CERRATO on: 1/18/2022 11:51 AM     Modules accepted: Orders     Statement Selected

## 2024-10-16 ENCOUNTER — OFFICE VISIT (OUTPATIENT)
Dept: OBSTETRICS AND GYNECOLOGY | Facility: CLINIC | Age: 44
End: 2024-10-16
Payer: COMMERCIAL

## 2024-10-16 VITALS
SYSTOLIC BLOOD PRESSURE: 120 MMHG | WEIGHT: 123 LBS | DIASTOLIC BLOOD PRESSURE: 70 MMHG | BODY MASS INDEX: 21.79 KG/M2 | RESPIRATION RATE: 18 BRPM | HEIGHT: 63 IN

## 2024-10-16 DIAGNOSIS — Z09 POSTOP CHECK: Primary | ICD-10-CM

## 2024-10-16 PROCEDURE — 99999 PR PBB SHADOW E&M-EST. PATIENT-LVL III: CPT | Mod: PBBFAC,,, | Performed by: OBSTETRICS & GYNECOLOGY

## 2024-10-16 PROCEDURE — 1159F MED LIST DOCD IN RCRD: CPT | Mod: CPTII,S$GLB,, | Performed by: OBSTETRICS & GYNECOLOGY

## 2024-10-16 PROCEDURE — 99024 POSTOP FOLLOW-UP VISIT: CPT | Mod: S$GLB,,, | Performed by: OBSTETRICS & GYNECOLOGY

## 2024-10-16 PROCEDURE — 3078F DIAST BP <80 MM HG: CPT | Mod: CPTII,S$GLB,, | Performed by: OBSTETRICS & GYNECOLOGY

## 2024-10-16 PROCEDURE — 3074F SYST BP LT 130 MM HG: CPT | Mod: CPTII,S$GLB,, | Performed by: OBSTETRICS & GYNECOLOGY

## 2024-10-16 NOTE — PROGRESS NOTES
"History of Present Illness:   Pateint presents today  6 weeks postop, status post Total laparoscopic Hysterectomy , with complaint of none.    Pathology:   09/09/2024 [facility]   UTERUS AND BILATERAL FALLOPIAN TUBES, HYSTERECTOMY AND BILATERAL    SALPINGECTOMY   - CERVIX WITH SQUAMOUS METAPLASIA AND CHRONIC CERVICITIS   - INACTIVE ENDOMETRIUM   - MYOMETRIUM WITH LEIOMYOMAS   - UTERINE SEROSA WITH NO SIGNIFICANT HISTOPATHOLOGIC FINDINGS   - BILATERAL FALLOPIAN TUBES WITH NO SIGNIFICANT HISTOPATHOLOGIC FINDINGS         Past medical and surgical history reviewed.   I have reviewed the patient's medical history in detail and updated the computerized patient record.    Physical exam:  /70   Resp 18   Ht 5' 3" (1.6 m)   Wt 55.8 kg (123 lb 0.3 oz)   LMP 09/05/2024 Comment: bleeding since June  BMI 21.79 kg/m²   Constitutional: She is oriented to person, place, and time. She appears well-developed and well-nourished. No distress.   HENT:   Head: Normocephalic and atraumatic.   Eyes: Conjunctivae and EOM are normal. No scleral icterus.   Neck: Normal range of motion. Neck supple. No tracheal deviation present.   Cardiovascular: Normal rate.    Pulmonary/Chest: Effort normal. No respiratory distress. She exhibits no tenderness.  Breasts: deferred  Abdominal: Soft. She exhibits no distension and no mass.  The incisions are healed well. There is no rebound and no guarding.   Genitourinary:    External rectal exam shows no thrombosed external hemorrhoids.    Pelvic exam was performed with patient supine.   No labial fusion.   There is no rash, lesion or injury on the right labia.   There is no rash, lesion or injury on the left labia.   No bleeding and no signs of injury around the vaginal introitus, urethra is without lesions and well supported.    No vaginal discharge found. Cuff healed well.   No significant Cystocele, Enterocele or rectocele, and cuff well supported.   Bimanual exam:   The urethra and vaginal are " without palpable masses or tenderness.   Uterus and cervix are surgically absents, vaginal cuff is intact and well supported.   Right adnexum displays no mass and no tenderness.   Left adnexum displays no mass and no tenderness.  Musculoskeletal: Normal range of motion.   Lymphadenopathy: No inguinal adenopathy present.   Neurological: She is alert and oriented to person, place, and time. Coordination normal.   Skin: Skin is warm and dry. She is not diaphoretic.   Psychiatric: She has a normal mood and affect.    Assessment:  Normal postop    Plan:  Follow up  1 year  Resume normal activity.

## 2024-10-22 ENCOUNTER — PATIENT MESSAGE (OUTPATIENT)
Dept: RESEARCH | Facility: HOSPITAL | Age: 44
End: 2024-10-22
Payer: COMMERCIAL

## 2024-11-18 ENCOUNTER — OFFICE VISIT (OUTPATIENT)
Dept: NEUROSURGERY | Facility: CLINIC | Age: 44
End: 2024-11-18
Payer: COMMERCIAL

## 2024-11-18 VITALS
BODY MASS INDEX: 21.79 KG/M2 | HEIGHT: 63 IN | HEART RATE: 85 BPM | SYSTOLIC BLOOD PRESSURE: 169 MMHG | RESPIRATION RATE: 18 BRPM | WEIGHT: 123 LBS | DIASTOLIC BLOOD PRESSURE: 114 MMHG

## 2024-11-18 DIAGNOSIS — M41.9 SCOLIOSIS OF THORACOLUMBAR SPINE, UNSPECIFIED SCOLIOSIS TYPE: Primary | ICD-10-CM

## 2024-11-18 DIAGNOSIS — M54.12 CERVICAL RADICULOPATHY: ICD-10-CM

## 2024-11-18 PROCEDURE — 3077F SYST BP >= 140 MM HG: CPT | Mod: CPTII,S$GLB,, | Performed by: PHYSICIAN ASSISTANT

## 2024-11-18 PROCEDURE — 99202 OFFICE O/P NEW SF 15 MIN: CPT | Mod: S$GLB,,, | Performed by: PHYSICIAN ASSISTANT

## 2024-11-18 PROCEDURE — 1159F MED LIST DOCD IN RCRD: CPT | Mod: CPTII,S$GLB,, | Performed by: PHYSICIAN ASSISTANT

## 2024-11-18 PROCEDURE — 3008F BODY MASS INDEX DOCD: CPT | Mod: CPTII,S$GLB,, | Performed by: PHYSICIAN ASSISTANT

## 2024-11-18 PROCEDURE — 3080F DIAST BP >= 90 MM HG: CPT | Mod: CPTII,S$GLB,, | Performed by: PHYSICIAN ASSISTANT

## 2024-11-18 NOTE — PROGRESS NOTES
Neurosurgery History and Physical    Patient ID: Kimberly Pearce is a 44 y.o. female.    Chief Complaint   Patient presents with    Neck Pain     Patient present to clinic with c/o right sided neck pain into arm.        HPI 11/18/24  Patient is a 44 year old female who presents today to establish care regarding her right sided neck and arm pain that started 1 week ago without fall or injury. Her pain is described as electrical shocking from the right neck down the right arm and into the forearm. She has shooting pain when she lifts the arm above 90 degrees or while driving. Her whole hand and arm will go numb if she lays on it, this resolves with positional changes. She denies any changes in her balance, handwriting, dropping things or changes in her fine motor skills. She has tried exercises on her own, heat/ice, advil, and a pain pill from previous surgery without relief. She normally works out performing Pilates, but has been resting from her recent surgery.     She has not had any previous surgeries or procedures regarding her neck.     Review of Systems   Musculoskeletal:  Positive for neck pain. Negative for gait problem.   Neurological:  Positive for numbness. Negative for weakness.       Past Medical History:   Diagnosis Date    Abnormal Pap smear     Abnormal Pap smear of vagina     Allergies     MVP (mitral valve prolapse)     PONV (postoperative nausea and vomiting)     Scoliosis     Uterine fibroid      Social History     Socioeconomic History    Marital status:    Tobacco Use    Smoking status: Never    Smokeless tobacco: Never   Substance and Sexual Activity    Alcohol use: Yes     Alcohol/week: 4.0 standard drinks of alcohol     Types: 4 Standard drinks or equivalent per week     Comment: casual weekends    Drug use: No    Sexual activity: Yes     Partners: Male     Birth control/protection: OCP     Family History   Problem Relation Name Age of Onset    Diabetes Maternal  Grandmother      Depression Father      Hyperlipidemia Mother      Breast cancer Neg Hx      Ovarian cancer Neg Hx      Uterine cancer Neg Hx      Colon cancer Neg Hx       Review of patient's allergies indicates:  No Known Allergies    Current Outpatient Medications:     ACZONE 7.5 % GlwP, as needed., Disp: , Rfl:     azelastine (ASTELIN) 137 mcg (0.1 %) nasal spray, as needed., Disp: , Rfl:     ethynodiol-ethinyl estradiol (KELNOR) 1-35 mg-mcg per tablet, Take 1 tablet by mouth once daily. (Patient taking differently: Take 1 tablet by mouth every evening.), Disp: 30 tablet, Rfl: 6    ferrous sulfate (FEOSOL) 325 mg (65 mg iron) Tab tablet, Take 325 mg by mouth daily with breakfast., Disp: , Rfl:     ibuprofen (ADVIL,MOTRIN) 800 MG tablet, Take 1 tablet (800 mg total) by mouth every 8 (eight) hours as needed for Pain., Disp: 30 tablet, Rfl: 1    montelukast (SINGULAIR) 10 mg tablet, Take 10 mg by mouth every evening., Disp: , Rfl:     multivitamin capsule, Take 1 capsule by mouth once daily., Disp: , Rfl:     ondansetron (ZOFRAN-ODT) 4 MG TbDL, Take 2 tablets (8 mg total) by mouth every 8 (eight) hours as needed., Disp: 12 tablet, Rfl: 0    polyethylene glycol (GLYCOLAX) 17 gram/dose powder, Take 17 g by mouth once daily. In 8 oz of water, Disp: 240 g, Rfl: 1    spironolactone (ALDACTONE) 50 MG tablet, Take 50 mg by mouth once daily., Disp: , Rfl:     vitamin D (VITAMIN D3) 1000 units Tab, Take 1,000 Units by mouth once daily., Disp: , Rfl:     oxyCODONE-acetaminophen (PERCOCET) 5-325 mg per tablet, Take 1 tablet by mouth every 4 (four) hours as needed for Pain. (Patient not taking: Reported on 11/18/2024), Disp: 30 tablet, Rfl: 0  No current facility-administered medications for this visit.    Facility-Administered Medications Ordered in Other Visits:     albuterol nebulizer solution 2.5 mg, 2.5 mg, Nebulization, Q15 Min PRN, Eleanor, Oleg S., MD    albuterol-ipratropium 2.5 mg-0.5 mg/3 mL  "nebulizer solution 3 mL, 3 mL, Nebulization, PRN, Oleg Webster MD    diphenhydrAMINE injection 25 mg, 25 mg, Intravenous, Q6H PRN, Oleg Webster MD    HYDROmorphone injection 0.5 mg, 0.5 mg, Intravenous, Q5 Min PRN, Oleg Webster MD    hyoscyamine ODT 0.25 mg, 0.25 mg, Sublingual, Once, Oleg Webster MD    ondansetron injection 4 mg, 4 mg, Intravenous, Q15 Min PRN, Oleg Webster MD    sodium chloride 0.9% flush 3 mL, 3 mL, Intravenous, Q8H, Oleg Webster MD    sodium chloride 0.9% flush 3 mL, 3 mL, Intravenous, PRN, Oleg Webster MD  Blood pressure (!) 169/114, pulse 85, resp. rate 18, height 5' 3" (1.6 m), weight 55.8 kg (123 lb 0.3 oz).      Neurological Exam  Mental Status  Awake, alert and oriented to person, place and time.    Motor                                               Right                     Left  Deltoid                                   5                          5   Biceps                                   5                          5   Triceps                                  4+                          5   Wrist flexor                            5                          5   Wrist extensor                       5                          5   Interossei                              5                          5   Iliopsoas                               5                          5   Quadriceps                           5                          5  Ankle dorsiflexor                   5                          5  Ankle plantar flexor              5                           5    Sensory  Sensation is intact to light touch, pinprick, vibration and proprioception in all four extremities.    Reflexes                                            Right                      Left  Biceps                                 2+                         2+  Triceps                                2+                         2+  Patellar                                2+           " "              2+  Achilles                                2+                         2+    Right pathological reflexes: Rodrick's present. Ankle clonus present. 2 beats.  Left pathological reflexes: Rodrick's absent. Ankle clonus present. 2 beats.    Coordination  Right: Rapid alternating movement normal.Left: Rapid alternating movement normal.    Gait  Normal casual, toe, heel and tandem gait. Romberg is absent.  Negative tinels at wrist and elbows bilaterally  No atrophy of arms or hands.      Physical Exam  Vitals and nursing note reviewed.   Neurological:      Coordination: Romberg sign negative.      Deep Tendon Reflexes:      Reflex Scores:       Tricep reflexes are 2+ on the right side and 2+ on the left side.       Bicep reflexes are 2+ on the right side and 2+ on the left side.       Patellar reflexes are 2+ on the right side and 2+ on the left side.       Achilles reflexes are 2+ on the right side and 2+ on the left side.     Comments: Negative tinels at wrist and elbows bilaterally  No atrophy of arms or hands       Vital Signs  Pulse: 85  Resp: 18  BP: (!) 169/114  BP Location: Left arm  Patient Position: Sitting  Pain Score:   7  Pain Loc: Neck  Height and Weight  Height: 5' 3" (160 cm)  Weight: 55.8 kg (123 lb 0.3 oz)  BSA (Calculated - sq m): 1.57 sq meters  BMI (Calculated): 21.8  Weight in (lb) to have BMI = 25: 140.8]    Provider dictation:  There is no imaging to review. Discussed with the patient that she does have some very mild weakness and + Alejandro on exam. Will obtain MRI and dynamic XR cervical spine and refer her to pain management and PT for trail of conservative treatment prior to surgical discussion. Did discuss sooner return to clinic with progressive weakness, balance issues or intractable pain. All questions were answered. .    Visit Diagnosis:  Scoliosis of thoracolumbar spine, unspecified scoliosis type  -     MRI Cervical Spine Without Contrast; Future; Expected date: " 11/18/2024    Cervical radiculopathy  -     X-Ray Cervical Spine AP Lat with Flexion  Extension; Future; Expected date: 11/18/2024  -     Ambulatory referral/consult to Pain Clinic; Future; Expected date: 11/25/2024  -     Ambulatory referral/consult to Physical/Occupational Therapy; Future; Expected date: 11/25/2024

## 2024-11-20 ENCOUNTER — PATIENT MESSAGE (OUTPATIENT)
Dept: NEUROSURGERY | Facility: CLINIC | Age: 44
End: 2024-11-20
Payer: COMMERCIAL

## 2024-11-22 ENCOUNTER — TELEPHONE (OUTPATIENT)
Dept: NEUROSURGERY | Facility: CLINIC | Age: 44
End: 2024-11-22
Payer: COMMERCIAL

## 2024-12-03 ENCOUNTER — CLINICAL SUPPORT (OUTPATIENT)
Dept: REHABILITATION | Facility: HOSPITAL | Age: 44
End: 2024-12-03
Payer: COMMERCIAL

## 2024-12-03 DIAGNOSIS — M54.12 CERVICAL RADICULOPATHY: ICD-10-CM

## 2024-12-03 DIAGNOSIS — M62.81 MUSCLE WEAKNESS: ICD-10-CM

## 2024-12-03 DIAGNOSIS — M54.2 NECK PAIN: Primary | ICD-10-CM

## 2024-12-03 DIAGNOSIS — M79.601 DIFFUSE PAIN IN RIGHT UPPER EXTREMITY: ICD-10-CM

## 2024-12-03 PROCEDURE — 97162 PT EVAL MOD COMPLEX 30 MIN: CPT | Mod: PN | Performed by: PHYSICAL THERAPIST

## 2024-12-03 PROCEDURE — 97530 THERAPEUTIC ACTIVITIES: CPT | Mod: PN | Performed by: PHYSICAL THERAPIST

## 2024-12-04 ENCOUNTER — TELEPHONE (OUTPATIENT)
Dept: NEUROSURGERY | Facility: CLINIC | Age: 44
End: 2024-12-04
Payer: COMMERCIAL

## 2024-12-04 ENCOUNTER — PATIENT MESSAGE (OUTPATIENT)
Dept: NEUROSURGERY | Facility: CLINIC | Age: 44
End: 2024-12-04
Payer: COMMERCIAL

## 2024-12-04 ENCOUNTER — HOSPITAL ENCOUNTER (OUTPATIENT)
Dept: RADIOLOGY | Facility: HOSPITAL | Age: 44
Discharge: HOME | End: 2024-12-04
Attending: PHYSICIAN ASSISTANT
Payer: COMMERCIAL

## 2024-12-04 DIAGNOSIS — M54.12 CERVICAL RADICULOPATHY: ICD-10-CM

## 2024-12-04 DIAGNOSIS — M41.9 SCOLIOSIS OF THORACOLUMBAR SPINE, UNSPECIFIED SCOLIOSIS TYPE: ICD-10-CM

## 2024-12-04 PROCEDURE — 72050 X-RAY EXAM NECK SPINE 4/5VWS: CPT | Mod: TC,FY,PO

## 2024-12-04 PROCEDURE — 72050 X-RAY EXAM NECK SPINE 4/5VWS: CPT | Mod: 26,,, | Performed by: RADIOLOGY

## 2024-12-04 PROCEDURE — 72141 MRI NECK SPINE W/O DYE: CPT | Mod: TC,PO

## 2024-12-04 PROCEDURE — 72141 MRI NECK SPINE W/O DYE: CPT | Mod: 26,,, | Performed by: RADIOLOGY

## 2024-12-04 NOTE — PLAN OF CARE
[facility]  Physical Therapy Initial Evaluation    Name: Kimberly Pearce  Clinic Number: [id number]    Therapy Diagnosis:   Encounter Diagnoses   Name Primary?    Neck pain Yes    Cervical radiculopathy     Diffuse pain in right upper extremity     Muscle weakness      Physician: Marina Whitfield PA-C    Physician Orders: PT Eval and Treat   Medical Diagnosis from Referral: M54.12 (ICD-10-CM) - Cervical radiculopathy   Evaluation Date: 12/3/2024  Authorization Period Expiration: 24  Plan of Care Expiration: 25  Progress Note Due: 1-3-25  Visit # / Visits authorized:     FOTO goal 66 11 visits  Foto  Date  Score    #1/3 12/3/2024 46   #2/3     #3/3         MD Follow up appointment: none scheduled         Precautions: scoliosis     Time In: 9:07  Time Out: 9:48  Total Billable Time: 41 minutes    Subjective   Date of onset: 4 weeks ago  History of current condition - Kimberly reports: woke up with pain denies previous neck issues  Pt states taking Advil heat Pt states scoliosis and some back issues over the years       Medical History:   Past Medical History:   Diagnosis Date    Abnormal Pap smear     Abnormal Pap smear of vagina     Allergies     MVP (mitral valve prolapse)     PONV (postoperative nausea and vomiting)     Scoliosis     Uterine fibroid        Surgical History:   Kimberly Pearce  has a past surgical history that includes Cosmetic surgery (2011); Breast surgery;  section; Breast Lift; Breast reconstruction (); Augmentation of breast (); turnbinate surgery (Bilateral); Tonsillectomy; Endometrial ablation (N/A, 2022); Hysteroscopy with dilation and curettage of uterus (N/A, 2022); Excision of lesion (Left, 2022); Adenoidectomy; and hysterectomy, vaginal, laparoscopy-assisted, with salpingectomy (Bilateral, 2024).    Medications:   Kimberly has a current medication list which includes the following prescription(s): aczone,  azelastine, ethynodiol-ethinyl estradiol, ferrous sulfate, ibuprofen, montelukast, multivitamin, ondansetron, oxycodone-acetaminophen, polyethylene glycol, spironolactone, and vitamin d, and the following Facility-Administered Medications: albuterol, albuterol-ipratropium, diphenhydramine, hydromorphone, hyoscyamine, ondansetron, sodium chloride 0.9%, and sodium chloride 0.9%.    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 Sleeping position: back sometimes side    Pts goals: feel better     Objective     Postural examination in standing:  - forward head  - forward shoulder  - R scapula winging  - R rib hump with FB trunk  - R thoracic kyphosis scoliosis  - L lumbar lordosis scoliosis  - atrophy noted of rhomboids B R>L    Postural examination in sitting:   - forward head  - forward shoulder  - sits erect       Functional assessment: no deficits in areas noted below  - walking/gait:   - sit to stand:   - sit to supine:         - supine to sit:   - supine to prone: not tested     Cervical ROM: (measured in degrees sitting)  - flexion -  45  stretching in neck                   - extension -  55   pain down to elbow                      - right side bending -  40        - left side bending -  45      - right rotation - 70 stretch neck  - left rotation - 70 stretch neck     Shoulder ROM: (measured in degrees standing)  Shoulder  RUE LUE   Active Flexion 170 170   Active Abduction 170 170     Scapular instability noted with elevation and abduction in standing with increased winging, lateral rotation and protraction of the R scapula.      Shoulder ROM: WNL    Muscle Strength  MMT R L   Elbow flexion 4+/5 5/5   Elbow extension 4+/5 5/5   Shoulder flexion 4+/5 5/5   Shoulder abduction 4+/5 5/5   Shoulder external rotation 4/5 4+/5   Shoulder internal rotation 4+/5 5/5        Upper trap 5/5 5/5     MMT R L   Cervical flexion 5/5 5/5   Cervical extension 5/5 5/5   Cervical sidebending 5/5 5/5     Endurance is not tested    Special tests: - alar and - compression  + response to flexion principles     Palpation: mod tightness and TTP cervical paraspinals and suboccipitals    Joint mobility: difficulty relaxing to fully assess cervical mobility    Sensation: Intact  Reflexes: +1 biceps and triceps       Intake Outcome Measure for FOTO neck Survey    Therapist reviewed FOTO scores for Kimberly Pearce on 12/3/2024.   FOTO documents entered into [employer] - see Media section or FOTO account episode details.    Intake Score: 46%       TREATMENT   Treatment Time In: 9:33  Treatment Time Out: 9:48  Total Treatment time separate from Evaluation: 15 minutes    Kimberly received the treatments listed below:      therapeutic exercises to develop ROM and Bright principles for discogenic signs and symptoms  for 5 minutes including:  Neck flexion x 10     therapeutic activities to improve functional performance for 10  minutes, including:  Therapeutic activities were performed to improve function and decrease pain with sleep.  Instructed pt in increased awareness of symptoms prior to going to bed and upon arising in AM and to note if pain is increased  during the night.  If increased symptoms noted or loss of sleep is noted modifications to sleep surface and/or posture during sleep needs to be addressed with instruction on ways to modify sleep surface and/or position to restore ability to sleep without disturbances due to pain and to assist healing process during the night by avoiding an increase in symptoms.   Awareness of sleep and instructed in proper pillows to consider   Instructed pt in extension/flexion principles and to focus on flexion principles.       Home Exercises and Patient Education Provided    Education provided:   - flexion principles  - proper posture   - HEP   - stretch to point of tightness not pain   - exercise in pain free zone     Written Home Exercises Provided:  Yes   Exercises were reviewed and Kimberly was able to demonstrate them prior to the end of the session.  Kimberly demonstrated good  understanding of the education provided.     See EMR under Patient Instructions for exercises provided.    Assessment   Kimberly is a 44 y.o. female referred to outpatient Physical Therapy with a medical diagnosis of M54.12 (ICD-10-CM) - Cervical radiculopathy . Pt presents with discogenic signs and symptoms that respond favorably to flexion principles    Pt prognosis is Good.   Rehab potential:  Good     Pt will benefit from skilled outpatient Physical Therapy to address the deficits stated above and in the chart below, provide pt/family education, and to maximize pt's level of independence.     Plan of care discussed with patient: Yes  Pt's spiritual, cultural and educational needs considered and patient is agreeable to the plan of care and goals as stated below:     Anticipated Barriers for therapy: none    Medical Necessity is demonstrated by the following  History  Co-morbidities and personal factors that may impact the plan of care [] LOW: no personal factors / co-morbidities  [x] MODERATE: 1-2 personal factors / co-morbidities  [] HIGH: 3+ personal  factors / co-morbidities    Moderate / High Support Documentation:   Co-morbidities affecting plan of care: scoliosis    Personal Factors:   no deficits     Examination  Body Structures and Functions, activity limitations and participation restrictions that may impact the plan of care [] LOW: addressing 1-2 elements  [] MODERATE: 3+ elements  [x] HIGH: 4+ elements (please support below)    Moderate / High Support Documentation: ROM, strength, lifting, Ability to perform household activities such as cooking, cleaning and assisting others       Clinical Presentation [] LOW: stable  [x] MODERATE: Evolving  [] HIGH: Unstable     Decision Making/ Complexity Score: moderate         GOALS:   Short Term Goals:  4 weeks  Increase range of motion 25%  Increase strength 1/2 muscle grade  Increase postural awareness to normal  Be able to perform HEP with minimal cueing required    Long Term Goals: 8 weeks  Increase range of motion to 75%to 100% of full  Increase strength 1 muscle grade  Improve scapular stabilization to normal  Restore normal sleep habits without disturbances due to pain  Restore ability to drive without increased pain  Restore ability to participate in an exercise program for Wellness   Restore ability to perform ADL's and household activities independently and without increased pain  Pt to be independent with HEP to improve ROM and independence with ADL's    Plan   Plan of care Certification: 12/3/2024 to 1-28-25.    Outpatient Physical Therapy 2 times weekly for 8 weeks to include the following interventions: Therapeutic exercises, manual therapy, neuromuscular re-education, therapeutic activities, modalities such as moist heat, ice, ultrasound and electrical stimulation, cervical mechanical traction and dry needling will be considered and utilized as needed    Melonie Oconnor, PT, DPT

## 2024-12-05 ENCOUNTER — CLINICAL SUPPORT (OUTPATIENT)
Dept: REHABILITATION | Facility: HOSPITAL | Age: 44
End: 2024-12-05
Payer: COMMERCIAL

## 2024-12-05 DIAGNOSIS — M79.601 DIFFUSE PAIN IN RIGHT UPPER EXTREMITY: ICD-10-CM

## 2024-12-05 DIAGNOSIS — M54.2 NECK PAIN: Primary | ICD-10-CM

## 2024-12-05 DIAGNOSIS — M62.81 MUSCLE WEAKNESS: ICD-10-CM

## 2024-12-05 DIAGNOSIS — M54.12 CERVICAL RADICULOPATHY: ICD-10-CM

## 2024-12-05 PROCEDURE — 97530 THERAPEUTIC ACTIVITIES: CPT | Mod: PN | Performed by: PHYSICAL THERAPIST

## 2024-12-05 PROCEDURE — 97112 NEUROMUSCULAR REEDUCATION: CPT | Mod: PN | Performed by: PHYSICAL THERAPIST

## 2024-12-05 PROCEDURE — 97140 MANUAL THERAPY 1/> REGIONS: CPT | Mod: PN | Performed by: PHYSICAL THERAPIST

## 2024-12-05 NOTE — PROGRESS NOTES
[facility]   Physical Therapy Treatment Note     Name: Kimberly Pearce  Clinic Number: [id number]    Therapy Diagnosis:   Encounter Diagnoses   Name Primary?    Neck pain Yes    Cervical radiculopathy     Diffuse pain in right upper extremity     Muscle weakness      Physician: Marina Whitfield PA-C    Visit Date: 12/5/2024    Physician Orders: PT Eval and Treat   Medical Diagnosis from Referral: M54.12 (ICD-10-CM) - Cervical radiculopathy   Evaluation Date: 12/3/2024  Authorization Period Expiration: 12-31-24  Plan of Care Expiration: 1-28-25  Progress Note Due: 1-3-25  Visit # / Visits authorized: 2/13     FOTO goal 66 11 visits  Foto  Date  Score    #1/3 12/3/2024 46   #2/3       #3/3             MD Follow up appointment: none scheduled            Precautions: scoliosis        PTA Visit #: 0/5       Time In: 10:35  Time Out: 11:14  Total Billable Time: 39 minutes    SUBJECTIVE     Pt reports: feeling a little better and found a pillow for neck.  Pt was compliant with home exercise program.  Response to previous treatment: felt ok  Functional change: can sleep better with new pillow     Pain: 3/10 in neck and 2/10 in shoulder  at end after DN 2/10 in neck and 2/10 in shoulder   Location: right side of neck into shoulder shooting pain to mid forearm R      MRI1.  There is degenerative change discussed in detail by level above.  There is no fracture.  These findings however are present in the setting of a spinal canal which may be borderline small on a developmental basis.  The most significant abnormality is present at the C5-6 level where there is a right paracentral disc extrusion with cephalad extension contributing to flattening of the right ventral cord surface, severe right lateral recess and proximal foraminal stenosis, moderate spinal stenosis.  Cord signal is grossly normal.  The study however is motion degraded which does limit evaluation of cord signal.     2.  At the C6-7 level  there is a broad left paracentral disc protrusion which flattens the left side of the cord and contributes to mild spinal stenosis and crowding of the left lateral recess.     3.  At the C4-5 level there is a broad right paracentral disc protrusion which flattens the right ventral cord surface and contributes to mild spinal canal and foraminal stenosis.       OBJECTIVE     Objective Measures updated at progress report unless specified.     Treatment     Kimberly received the treatments listed below:       therapeutic activities to improve functional performance for 10  minutes, including:  Reviewed sleep position and pt to borrow sister's pillow to try also Pt with another pillow that is better than previous pillow  Instructed pt further in unloading and to continue with q 2 hour ex or at onset of symptoms     Instructed pt how progression to extension would occur     manual therapy techniques: dry needling  were applied to the: neck and upper trap for 15 minutes, including:  Dry needling consent form was reviewed with the patient addressing all questions and concerns and signed by patient.  Patient also gave verbal consent to undergo dry needling.  Copy of the consent form was not provided to patient as requested by patient.   Dry needling with trigger point/manual therapy techniques was performed to R upper trap, B suboccipital C 5 and C 6 B .  All needles were removed and changes in signs and symptoms were decreased pain 2/10 neck and 2/10 still in shoulder.  Dry needling was performed to decrease inflammation, increase circulation, decrease pain and restore homeostasis.       neuromuscular re-education activities to improve: posture and to decrease discogenic signs and symptoms for 14 minutes. The following activities were included:  Instructed pt further in proper posture and need to work on   Chin tuck x 10   Neck flexion x 10 to decrease radicular symptoms   Patient Education and Home Exercises     Home  Exercises Provided and Patient Education Provided     Education provided:   - flexion/extension principles  - HEP   - stretch to point of tightness not pain   - exercise in pain free zone       Written Home Exercises Provided: yes. Exercises were reviewed and Kimberly was able to demonstrate them prior to the end of the session.  Kimberly demonstrated good understanding of the education provided. See EMR under Patient Instructions for exercises provided during therapy sessions    ASSESSMENT     Pt with improved symptoms, improved sleep  Pt maintains slight FB posture and appears to understand to work to achieve neutral as possible Patient responded well to dry needling as noted above      Kimberly Is progressing well towards her goals.     Pt prognosis is Good.   Rehab potential:  Good    Pt will continue to benefit from skilled outpatient physical therapy to address the goals listed in the initial evaluation, provide pt/family education and to maximize pt's level of independence in the home and community environment.     Pt's spiritual, cultural and educational needs considered and pt agreeable to plan of care and goals.     Anticipated barriers to physical therapy: none    GOALS:   Short Term Goals:  4 weeks  Increase range of motion 25%  Increase strength 1/2 muscle grade  Increase postural awareness to normal  Be able to perform HEP with minimal cueing required     Long Term Goals: 8 weeks  Increase range of motion to 75%to 100% of full  Increase strength 1 muscle grade  Improve scapular stabilization to normal  Restore normal sleep habits without disturbances due to pain  Restore ability to drive without increased pain  Restore ability to participate in an exercise program for Wellness   Restore ability to perform ADL's and household activities independently and without increased pain  Pt to be independent with HEP to improve ROM and independence with ADL's    PLAN   Continue with established plan of care towards PT  goals.      Maintain flexion principles  Consider cervical traction as needed    Melonie Oconnor, PT, DPT

## 2024-12-09 NOTE — PROGRESS NOTES
[facility]   Physical Therapy Treatment Note     Name: Kimberly Pearce  Clinic Number: [id number]    Therapy Diagnosis:   Encounter Diagnoses   Name Primary?    Neck pain Yes    Cervical radiculopathy     Diffuse pain in right upper extremity     Muscle weakness      Physician: Marina Whitfield PA-C    Visit Date: 12/10/2024    Physician Orders: PT Eval and Treat   Medical Diagnosis from Referral: M54.12 (ICD-10-CM) - Cervical radiculopathy   Evaluation Date: 12/3/2024  Authorization Period Expiration: 12-31-24  Plan of Care Expiration: 1-28-25  Progress Note Due: 1-3-25  Visit # / Visits authorized: 3/13     FOTO goal 66 11 visits  Foto  Date  Score    #1/3 12/3/2024 46   #2/3       #3/3             MD Follow up appointment: none scheduled            Precautions: scoliosis        PTA Visit #: 0/5       Time In: 9:06  Time Out: 10:30  Total Billable Time: 45 Rx minutes    SUBJECTIVE     Pt reports: feeling a little better achy in shoulder   Pt performs ex when feeling tight denies radicular pain down arm  Pt states DN was helpful  Pt was compliant with home exercise program.  Response to previous treatment: felt ok  Functional change: can sleep better with new pillow     Pain: 0/10 in neck and 4/10 in shoulder  nothing down arm at end after DN 2/10 in neck and 2/10 in shoulder   Location: right side of neck into shoulder shooting pain to mid forearm R      MRI1.  There is degenerative change discussed in detail by level above.  There is no fracture.  These findings however are present in the setting of a spinal canal which may be borderline small on a developmental basis.  The most significant abnormality is present at the C5-6 level where there is a right paracentral disc extrusion with cephalad extension contributing to flattening of the right ventral cord surface, severe right lateral recess and proximal foraminal stenosis, moderate spinal stenosis.  Cord signal is grossly normal.  The  study however is motion degraded which does limit evaluation of cord signal.     2.  At the C6-7 level there is a broad left paracentral disc protrusion which flattens the left side of the cord and contributes to mild spinal stenosis and crowding of the left lateral recess.     3.  At the C4-5 level there is a broad right paracentral disc protrusion which flattens the right ventral cord surface and contributes to mild spinal canal and foraminal stenosis.       OBJECTIVE     Cervical rotation after ROM ex 60 R and 70 L     Treatment     Kimberly received the treatments listed below:      Therapeutic exercises were performed to improve ROM, strength, flexibility and stabilization for 15 minutes.       Pt able to extend to about 10 degrees without pain Noted pt maintaining slight flexion without pain   Cervical rotation x 10  (NP)therapeutic activities to improve functional performance for 0  minutes, including:  Reviewed sleep position and pt to borrow sister's pillow to try also Pt with another pillow that is better than previous pillow  Instructed pt further in unloading and to continue with q 2 hour ex or at onset of symptoms     Instructed pt how progression to extension would occur     manual therapy techniques: dry needling  were applied to the: neck and upper trap for 15 minutes, including:    Patient gave verbal consent to undergo dry needling.    Dry needling with trigger point/manual therapy techniques was performed to subscap R, supine R upper trap, levator scapulae  and R shoulder joint and infraspinatus point  During needling to L upper trap pt reported R shoulder was hurting removed needles and pt sat up Pt became lightheaded and water was provided along with ice to neck and forehead and then was placed in supine with feet elevated   Pulseox originally 92 and then after 5 min went to 100  BP was 140/100  K. JOSHUA Espinoza  continued to monitor pt for PT and pt reported she was feeling fine though BP  remained high at 140/100 Pt reported she has white coat syndrome and this elevation is not abnormal for her when she is under stress or feels nervous  Pt was gradually elevated back to sitting and was able to stand and walk with no symptoms  Pt was instructed to go home and call office when she arrived for she reported she felt fine and did not need someone to get her Pt was called when we did not hear from her and she stated she had gone to grocery and was now going home and felt fine (NP)B suboccipital C 5 and C 6 B .  All needles were removed and changes in signs and symptoms were decreased pain 1/10  in shoulder.  Dry needling was performed to decrease inflammation, increase circulation, decrease pain and restore homeostasis.       neuromuscular re-education activities to improve: posture and to decrease discogenic signs and symptoms for 15 minutes. The following activities were included:  Instructed pt further in proper posture and need to work on   Chin tuck x 10   Neck flexion x 10      Protraction x 20 supine   Pulldown with retraction with YTB x 10  Retraction with YTB x 10  ER with YTB x 10  Provided pt YTB for home use    Patient Education and Home Exercises     Home Exercises Provided and Patient Education Provided     Education provided:   - flexion/extension principles  - HEP   - stretch to point of tightness not pain   - exercise in pain free zone       Written Home Exercises Provided: yes. Exercises were reviewed and Kimberly was able to demonstrate them prior to the end of the session.  Kimberly demonstrated good understanding of the education provided. See EMR under Patient Instructions for exercises provided during therapy sessions    ASSESSMENT     Pt with improved symptoms overall and able to progress with strengthening and stab.  Pt with negative response to DN so will hold in future      Kimberly Is progressing well towards her goals.     Pt prognosis is Good.   Rehab potential:  Good    Pt will  continue to benefit from skilled outpatient physical therapy to address the goals listed in the initial evaluation, provide pt/family education and to maximize pt's level of independence in the home and community environment.     Pt's spiritual, cultural and educational needs considered and pt agreeable to plan of care and goals.     Anticipated barriers to physical therapy: none    GOALS:   Short Term Goals:  4 weeks (Progressing, not met)  Increase range of motion 25%  Increase strength 1/2 muscle grade  Increase postural awareness to normal  Be able to perform HEP with minimal cueing required     Long Term Goals: 8 weeks (Progressing, not met)  Increase range of motion to 75%to 100% of full  Increase strength 1 muscle grade  Improve scapular stabilization to normal  Restore normal sleep habits without disturbances due to pain  Restore ability to drive without increased pain  Restore ability to participate in an exercise program for Wellness   Restore ability to perform ADL's and household activities independently and without increased pain  Pt to be independent with HEP to improve ROM and independence with ADL's    PLAN   Continue with established plan of care towards PT goals.      Maintain flexion principles  Consider cervical traction as needed    Melonie Oconnor, PT

## 2024-12-10 ENCOUNTER — CLINICAL SUPPORT (OUTPATIENT)
Dept: REHABILITATION | Facility: HOSPITAL | Age: 44
End: 2024-12-10
Payer: COMMERCIAL

## 2024-12-10 DIAGNOSIS — M62.81 MUSCLE WEAKNESS: ICD-10-CM

## 2024-12-10 DIAGNOSIS — M54.2 NECK PAIN: Primary | ICD-10-CM

## 2024-12-10 DIAGNOSIS — M79.601 DIFFUSE PAIN IN RIGHT UPPER EXTREMITY: ICD-10-CM

## 2024-12-10 DIAGNOSIS — M54.12 CERVICAL RADICULOPATHY: ICD-10-CM

## 2024-12-10 PROCEDURE — 97112 NEUROMUSCULAR REEDUCATION: CPT | Mod: PN | Performed by: PHYSICAL THERAPIST

## 2024-12-10 PROCEDURE — 97140 MANUAL THERAPY 1/> REGIONS: CPT | Mod: PN | Performed by: PHYSICAL THERAPIST

## 2024-12-10 PROCEDURE — 97110 THERAPEUTIC EXERCISES: CPT | Mod: PN | Performed by: PHYSICAL THERAPIST

## 2024-12-11 NOTE — PROGRESS NOTES
[facility]   Physical Therapy Treatment Note     Name: Kimberly Pearce  Clinic Number: [id number]    Therapy Diagnosis:   Encounter Diagnoses   Name Primary?    Neck pain Yes    Cervical radiculopathy     Diffuse pain in right upper extremity     Muscle weakness      Physician: Marina Whitfield PA-C    Visit Date: 12/12/2024    Physician Orders: PT Eval and Treat   Medical Diagnosis from Referral: M54.12 (ICD-10-CM) - Cervical radiculopathy   Evaluation Date: 12/3/2024  Authorization Period Expiration: 12-31-24  Plan of Care Expiration: 1-28-25  Progress Note Due: 1-3-25  Visit # / Visits authorized: 4/13     FOTO goal 66 11 visits  Foto  Date  Score    #1/3 12/3/2024 46   #2/3       #3/3             MD Follow up appointment: none scheduled            Precautions: scoliosis        PTA Visit #: 0/5       Time In: 9:03  Time Out: 9:42  Total Billable Time: 49  minutes    SUBJECTIVE     Pt reports: some scapula soreness and mild neck pain  Pt was compliant with home exercise program.  Response to previous treatment: felt ok  Functional change: can sleep better with new pillow     Pain: 1/10 in neck and 1-2/10 in scapula nothing down arm or in shoulder  at end 1/10 neck and scapula     Location: right side of neck into shoulder shooting pain to mid forearm R      MRI1.  There is degenerative change discussed in detail by level above.  There is no fracture.  These findings however are present in the setting of a spinal canal which may be borderline small on a developmental basis.  The most significant abnormality is present at the C5-6 level where there is a right paracentral disc extrusion with cephalad extension contributing to flattening of the right ventral cord surface, severe right lateral recess and proximal foraminal stenosis, moderate spinal stenosis.  Cord signal is grossly normal.  The study however is motion degraded which does limit evaluation of cord signal.     2.  At the C6-7  level there is a broad left paracentral disc protrusion which flattens the left side of the cord and contributes to mild spinal stenosis and crowding of the left lateral recess.     3.  At the C4-5 level there is a broad right paracentral disc protrusion which flattens the right ventral cord surface and contributes to mild spinal canal and foraminal stenosis.       OBJECTIVE     Cervical rotation after ROM ex 70 R  10 L at last session 60 R and 70 L     Treatment     Kimberly received the treatments listed below:      Therapeutic exercises were performed to improve ROM, strength, flexibility and stabilization for 10 minutes.       Reviewed how to perform program   Start with chin tuck and neck flexion perform all ex and scap stab and then end with chin tuck and neck flexion last continue q 2 hours flexion principles or at onset of increased symptoms   Cervical rotation x 10    therapeutic activities to improve functional performance for 10  minutes, including:  Waiting on pillow to come in     Instructed pt in role of levator scapulae with FH position and need to do more unloading   Kenny cards but probably wrapping to keep to minimum     Reviewed sleep position and pt to borrow sister's pillow to try also Pt with another pillow that is better than previous pillow  Instructed pt further in unloading and to continue with q 2 hour ex or at onset of symptoms     Instructed pt how progression to extension would occur     manual therapy techniques: soft tissue mobilization  were applied to the: neck and upper trap and levator scapulae and suboccipital for 10 minutes, including:  STM suboccipital release mod-severe tightness levator scapulae and min- mod suboccipital      HOLD Dry needling with trigger point/manual therapy techniques was performed to subscap R, supine R upper trap, levator scapulae  and R shoulder joint and infraspinatus point  During needling to L upper trap pt reported R shoulder was hurting removed  needles and pt sat up Pt became lightheaded and water was provided along with ice to neck and forehead and then was placed in supine with feet elevated   Pulseox originally 92 and then after 5 min went to 100  BP was 140/100  K. Olga DPT  continued to monitor pt for PT and pt reported she was feeling fine though BP remained high at 140/100 Pt reported she has white coat syndrome and this elevation is not abnormal for her when she is under stress or feels nervous  Pt was gradually elevated back to sitting and was able to stand and walk with no symptoms  Pt was instructed to go home and call office when she arrived for she reported she felt fine and did not need someone to get her Pt was called when we did not hear from her and she stated she had gone to grocery and was now going home and felt fine (NP)B suboccipital C 5 and C 6 B .  All needles were removed and changes in signs and symptoms were decreased pain 1/10  in shoulder.  Dry needling was performed to decrease inflammation, increase circulation, decrease pain and restore homeostasis.       neuromuscular re-education activities to improve: posture and to decrease discogenic signs and symptoms for 10 minutes. The following activities were included:  Instructed pt further in proper posture and need to work on   Chin tuck x 10   Neck flexion x 10     Protraction x 20 supine with 2#   Pulldown with retraction with YTB x 10  Retraction with YTB x 5 stopped with fatigue   ER with YTB x 16 R  Provided pt YTB for home use    Instructed pt to increase sets per day to help build endurance     Patient Education and Home Exercises     Home Exercises Provided and Patient Education Provided     Education provided:   - flexion/extension principles  - HEP   - stretch to point of tightness not pain   - exercise in pain free zone       Written Home Exercises Provided: continue prior HEP . Exercises were reviewed and Kimberly was able to demonstrate them prior to the end of  the session.  Kimberly demonstrated good understanding of the education provided. See EMR under Patient Instructions for exercises provided during therapy sessions    ASSESSMENT     Pt with improved neck rotation Radicular symptoms further improved overall  Levator scap tightness may be scap pain vs radicular symptoms Pt appears to understand need to increase unloading to avoid working levator scapulae too much Pt tolerated treatment well and able to progress with strengthening and stabilization with resistance and reps as tolerated without adverse effect     Kimberly Is progressing well towards her goals.     Pt prognosis is Good.   Rehab potential:  Good    Pt will continue to benefit from skilled outpatient physical therapy to address the goals listed in the initial evaluation, provide pt/family education and to maximize pt's level of independence in the home and community environment.     Pt's spiritual, cultural and educational needs considered and pt agreeable to plan of care and goals.     Anticipated barriers to physical therapy: none    GOALS:   Short Term Goals:  4 weeks (Progressing, not met)  Increase range of motion 25%  Increase strength 1/2 muscle grade  Increase postural awareness to normal  Be able to perform HEP with minimal cueing required     Long Term Goals: 8 weeks (Progressing, not met)  Increase range of motion to 75%to 100% of full  Increase strength 1 muscle grade  Improve scapular stabilization to normal  Restore normal sleep habits without disturbances due to pain  Restore ability to drive without increased pain  Restore ability to participate in an exercise program for Wellness   Restore ability to perform ADL's and household activities independently and without increased pain  Pt to be independent with HEP to improve ROM and independence with ADL's    PLAN   Continue with established plan of care towards PT goals.      Maintain flexion principles  Consider cervical traction as needed   Consider levator scapulae stretch     Melonei Oconnor, PT

## 2024-12-12 ENCOUNTER — CLINICAL SUPPORT (OUTPATIENT)
Dept: REHABILITATION | Facility: HOSPITAL | Age: 44
End: 2024-12-12
Payer: COMMERCIAL

## 2024-12-12 DIAGNOSIS — M54.2 NECK PAIN: Primary | ICD-10-CM

## 2024-12-12 DIAGNOSIS — M54.12 CERVICAL RADICULOPATHY: ICD-10-CM

## 2024-12-12 DIAGNOSIS — M79.601 DIFFUSE PAIN IN RIGHT UPPER EXTREMITY: ICD-10-CM

## 2024-12-12 DIAGNOSIS — M62.81 MUSCLE WEAKNESS: ICD-10-CM

## 2024-12-12 PROCEDURE — 97140 MANUAL THERAPY 1/> REGIONS: CPT | Mod: PN | Performed by: PHYSICAL THERAPIST

## 2024-12-12 PROCEDURE — 97530 THERAPEUTIC ACTIVITIES: CPT | Mod: PN | Performed by: PHYSICAL THERAPIST

## 2024-12-12 PROCEDURE — 97112 NEUROMUSCULAR REEDUCATION: CPT | Mod: PN | Performed by: PHYSICAL THERAPIST

## 2024-12-12 PROCEDURE — 97110 THERAPEUTIC EXERCISES: CPT | Mod: PN | Performed by: PHYSICAL THERAPIST

## 2024-12-17 ENCOUNTER — CLINICAL SUPPORT (OUTPATIENT)
Dept: REHABILITATION | Facility: HOSPITAL | Age: 44
End: 2024-12-17
Payer: COMMERCIAL

## 2024-12-17 DIAGNOSIS — M62.81 MUSCLE WEAKNESS: ICD-10-CM

## 2024-12-17 DIAGNOSIS — M54.2 NECK PAIN: Primary | ICD-10-CM

## 2024-12-17 DIAGNOSIS — M79.601 DIFFUSE PAIN IN RIGHT UPPER EXTREMITY: ICD-10-CM

## 2024-12-17 DIAGNOSIS — M54.12 CERVICAL RADICULOPATHY: ICD-10-CM

## 2024-12-17 PROCEDURE — 97140 MANUAL THERAPY 1/> REGIONS: CPT | Mod: PN | Performed by: PHYSICAL THERAPIST

## 2024-12-17 PROCEDURE — 97112 NEUROMUSCULAR REEDUCATION: CPT | Mod: PN | Performed by: PHYSICAL THERAPIST

## 2024-12-17 PROCEDURE — 97110 THERAPEUTIC EXERCISES: CPT | Mod: PN | Performed by: PHYSICAL THERAPIST

## 2024-12-18 NOTE — PROGRESS NOTES
[facility]   Physical Therapy Treatment Note     Name: Kimberly Pearce  Clinic Number: [id number]    Therapy Diagnosis:   Encounter Diagnoses   Name Primary?    Neck pain Yes    Cervical radiculopathy     Diffuse pain in right upper extremity     Muscle weakness      Physician: Marina Whitfield PA-C    Visit Date: 12/19/2024    Physician Orders: PT Eval and Treat   Medical Diagnosis from Referral: M54.12 (ICD-10-CM) - Cervical radiculopathy   Evaluation Date: 12/3/2024  Authorization Period Expiration: 12-31-24  Plan of Care Expiration: 1-28-25  Progress Note Due: 1-3-25  Visit # / Visits authorized: 6/13     FOTO goal 66 11 visits  Foto  Date  Score    #1/3 12/3/2024 46   #2/3       #3/3             MD Follow up appointment: none scheduled            Precautions: scoliosis        PTA Visit #: 0/5     Time In: 8:17  Time Out: 9:03  Total Billable Time: 46  minutes    SUBJECTIVE     Pt reports: no change in pain in levator with taping and noted increased soreness to touch in forearm and when gripped noticed weakness   Pt was compliant with home exercise program.  Response to previous treatment: felt ok  Functional change: can sleep better with new pillow     Pain: 0/10 in neck at rest 1-2/10 in scapula TTP in forearm 2/10 at end 1/10 neck and scapula     Location: right side of neck into shoulder shooting pain to mid forearm R      MRI1.  There is degenerative change discussed in detail by level above.  There is no fracture.  These findings however are present in the setting of a spinal canal which may be borderline small on a developmental basis.  The most significant abnormality is present at the C5-6 level where there is a right paracentral disc extrusion with cephalad extension contributing to flattening of the right ventral cord surface, severe right lateral recess and proximal foraminal stenosis, moderate spinal stenosis.  Cord signal is grossly normal.  The study however is motion  degraded which does limit evaluation of cord signal.     2.  At the C6-7 level there is a broad left paracentral disc protrusion which flattens the left side of the cord and contributes to mild spinal stenosis and crowding of the left lateral recess.     3.  At the C4-5 level there is a broad right paracentral disc protrusion which flattens the right ventral cord surface and contributes to mild spinal canal and foraminal stenosis.       OBJECTIVE     In sitting  strength 25# R  45# L   Pt is R handed  Cervical rotation 70 B    Treatment     Kimberly received the treatments listed below:      Therapeutic exercises were performed to improve ROM, strength, flexibility and stabilization for 12 minutes.         Start with chin tuck and neck flexion perform all ex and scap stab and then end with chin tuck and neck flexion last continue q 2 hours flexion principles or at onset of increased symptoms   Cervical rotation x 10    Provided yellow/green combo theraputty for  strengthening  with putty for 1-2 min     neuromuscular re-education activities to improve: posture and to decrease discogenic signs and symptoms for 16 minutes. The following activities were included:  Instructed pt further in proper posture and need to work on   Chin tuck x 10   Neck flexion x 10     Protraction x 20 supine with 2#   Pulldown with retraction with YTB x 15  Retraction with YTB x 15   ER with YTB x 20 B  Provided pt YTB for home use    Instructed pt to increase sets per day to help build endurance     [facility]3 was performed to provide support to scapula and shoulder to decrease pain and improve functional use of UE.  Instructed pt in use, care and precautions with tape.       manual therapy techniques: soft tissue mobilization  were applied to the: neck and upper trap and levator scapulae and suboccipital for 8 minutes, including:  STM suboccipital release mod-severe tightness levator scapulae and min-  mod suboccipital  Provided info on theracane for scapula STM    Ultrasound  for pain control and to decrease inflammation @ continuous duty cycle, 1 Mhz, applied to R levator scapulae, intensity = 1.7 w/cm2 for 8 minutes.      (NP)therapeutic activities to improve functional performance for 0  minutes, including:    Instructed pt in role of levator scapulae with FH position and need to do more unloading   Kenny cards but probably wrapping to keep to minimum     Reviewed sleep position and pt to borrow sister's pillow to try also Pt with another pillow that is better than previous pillow  Instructed pt further in unloading and to continue with q 2 hour ex or at onset of symptoms     Patient Education and Home Exercises     Home Exercises Provided and Patient Education Provided     Education provided:   - flexion/extension principles  - HEP   - stretch to point of tightness not pain   - exercise in pain free zone       Written Home Exercises Provided: continue prior HEP . Exercises were reviewed and Kimberly was able to demonstrate them prior to the end of the session.  Kimberly demonstrated good understanding of the education provided. See EMR under Patient Instructions for exercises provided during therapy sessions    ASSESSMENT   Pt with start of forearm sensitivity Pt has noticed weakness even before and may benefit from  strengthening Hold Chen taping in case taping may have led to aggravation of radicular symptoms.  Pt with decreased levator scapulae tightness after US and will consider KT tape to levator scapulae Pt juliet treatment well with decreased pain at end of session      Kimberly Is progressing well towards her goals.     Pt prognosis is Good.   Rehab potential:  Good    Pt will continue to benefit from skilled outpatient physical therapy to address the goals listed in the initial evaluation, provide pt/family education and to maximize pt's level of independence in the home and community  environment.     Pt's spiritual, cultural and educational needs considered and pt agreeable to plan of care and goals.     Anticipated barriers to physical therapy: none    GOALS:   Short Term Goals:  4 weeks (Progressing, not met)  Increase range of motion 25%  Increase strength 1/2 muscle grade  Increase postural awareness to normal  Be able to perform HEP with minimal cueing required     Long Term Goals: 8 weeks (Progressing, not met)  Increase range of motion to 75%to 100% of full  Increase strength 1 muscle grade  Improve scapular stabilization to normal  Restore normal sleep habits without disturbances due to pain  Restore ability to drive without increased pain  Restore ability to participate in an exercise program for Wellness   Restore ability to perform ADL's and household activities independently and without increased pain  Pt to be independent with HEP to improve ROM and independence with ADL's    PLAN   Continue with established plan of care towards PT goals.      Maintain flexion principles  Consider cervical traction as needed  Consider levator scapulae stretch     Melonie Oconnor, PT

## 2024-12-19 ENCOUNTER — CLINICAL SUPPORT (OUTPATIENT)
Dept: REHABILITATION | Facility: HOSPITAL | Age: 44
End: 2024-12-19
Payer: COMMERCIAL

## 2024-12-19 DIAGNOSIS — M79.601 DIFFUSE PAIN IN RIGHT UPPER EXTREMITY: ICD-10-CM

## 2024-12-19 DIAGNOSIS — M54.2 NECK PAIN: Primary | ICD-10-CM

## 2024-12-19 DIAGNOSIS — M54.12 CERVICAL RADICULOPATHY: ICD-10-CM

## 2024-12-19 DIAGNOSIS — M62.81 MUSCLE WEAKNESS: ICD-10-CM

## 2024-12-19 PROCEDURE — 97140 MANUAL THERAPY 1/> REGIONS: CPT | Mod: PN | Performed by: PHYSICAL THERAPIST

## 2024-12-19 PROCEDURE — 97112 NEUROMUSCULAR REEDUCATION: CPT | Mod: PN | Performed by: PHYSICAL THERAPIST

## 2024-12-19 PROCEDURE — 97110 THERAPEUTIC EXERCISES: CPT | Mod: PN | Performed by: PHYSICAL THERAPIST

## 2024-12-19 PROCEDURE — 97035 APP MDLTY 1+ULTRASOUND EA 15: CPT | Mod: PN | Performed by: PHYSICAL THERAPIST

## 2024-12-20 NOTE — PROGRESS NOTES
[facility]   Physical Therapy Treatment Note     Name: Kimberly Pearce  Clinic Number: [id number]    Therapy Diagnosis:   Encounter Diagnoses   Name Primary?    Neck pain Yes    Cervical radiculopathy     Diffuse pain in right upper extremity     Muscle weakness      Physician: Marina Whitfield PA-C    Visit Date: 12/23/2024    Physician Orders: PT Eval and Treat   Medical Diagnosis from Referral: M54.12 (ICD-10-CM) - Cervical radiculopathy   Evaluation Date: 12/3/2024  Authorization Period Expiration: 12-31-24  Plan of Care Expiration: 1-28-25  Progress Note Due: 1-3-25  Visit # / Visits authorized: 7/13     FOTO goal 66 11 visits  Foto  Date  Score    #1/3 12/3/2024 46   #2/3       #3/3             MD Follow up appointment: none scheduled            Precautions: scoliosis        PTA Visit #: 0/5     Time In: 105  Time Out: 1:45  Total Billable Time: 40  minutes    SUBJECTIVE     Pt reports:little tight still levator and neck forearm less pain   Pt was compliant with home exercise program.  Response to previous treatment: felt ok  Functional change: can sleep better with new pillow     Pain: 1/10 in neck at rest 2/10 in scapula TTP in forearm 2/10 at end 1/10 neck and scapula     Location: right side of neck into shoulder shooting pain to mid forearm R      MRI1.  There is degenerative change discussed in detail by level above.  There is no fracture.  These findings however are present in the setting of a spinal canal which may be borderline small on a developmental basis.  The most significant abnormality is present at the C5-6 level where there is a right paracentral disc extrusion with cephalad extension contributing to flattening of the right ventral cord surface, severe right lateral recess and proximal foraminal stenosis, moderate spinal stenosis.  Cord signal is grossly normal.  The study however is motion degraded which does limit evaluation of cord signal.     2.  At the C6-7  "level there is a broad left paracentral disc protrusion which flattens the left side of the cord and contributes to mild spinal stenosis and crowding of the left lateral recess.     3.  At the C4-5 level there is a broad right paracentral disc protrusion which flattens the right ventral cord surface and contributes to mild spinal canal and foraminal stenosis.       OBJECTIVE     In sitting  strength 25# R  45# L   Pt is R handed  Cervical rotation 70 B    Treatment     Kimberly received the treatments listed below:      Therapeutic exercises were performed to improve ROM, strength, flexibility and stabilization for 5 minutes.         Start with chin tuck and neck flexion perform all ex and scap stab and then end with chin tuck and neck flexion last continue q 2 hours flexion principles or at onset of increased symptoms   Cervical rotation x 10    Pt reports working with theraputty for      neuromuscular re-education activities to improve: posture and to decrease discogenic signs and symptoms for 15 minutes. The following activities were included:  Instructed pt further in proper posture and need to work on   Chin tuck x 10   Neck flexion x 10     Protraction x 20 supine with 2#   Pulldown with retraction with YTB x 15  Retraction with YTB x 15   ER with YTB x 20 B  Provided pt YTB for home use    Instructed pt to increase sets per day to help build endurance     Kinesiotape with 4" star for pain relief was performed over the levator scapulae insertion on scapula R .  Instructed pt in use, care and precautions with tape.       [facility] shoulder taping #1,2,3 was performed to provide support to scapula and shoulder to decrease pain and improve functional use of UE.  Instructed pt in use, care and precautions with tape.       manual therapy techniques: soft tissue mobilization  were applied to the: neck and upper trap and levator scapulae and suboccipital for 10 minutes, including:  STM suboccipital release " mod-severe tightness levator scapulae and min- mod suboccipital      Ultrasound  for pain control and to decrease inflammation @ continuous duty cycle, 1 Mhz, applied to R levator scapulae, intensity = 1.7 w/cm2 for 8 minutes.      (NP)therapeutic activities to improve functional performance for 0  minutes, including:    Instructed pt in role of levator scapulae with FH position and need to do more unloading   [place] cards but probably wrapping to keep to minimum     Reviewed sleep position and pt to borrow sister's pillow to try also Pt with another pillow that is better than previous pillow  Instructed pt further in unloading and to continue with q 2 hour ex or at onset of symptoms     Patient Education and Home Exercises     Home Exercises Provided and Patient Education Provided     Education provided:   - flexion/extension principles  - HEP   - stretch to point of tightness not pain   - exercise in pain free zone       Written Home Exercises Provided: continue prior HEP . Exercises were reviewed and Kimberly was able to demonstrate them prior to the end of the session.  Kimberly demonstrated good understanding of the education provided. See EMR under Patient Instructions for exercises provided during therapy sessions    ASSESSMENT   Pt with improved forearm sensitivity  Pt may benefit from KT tape, continue US due to decreased symptoms after treatment Pt slowly improving with scap stab.  Pt juliet treatment well and decreased pain at end of session       Kimberly Is progressing well towards her goals.     Pt prognosis is Good.   Rehab potential:  Good    Pt will continue to benefit from skilled outpatient physical therapy to address the goals listed in the initial evaluation, provide pt/family education and to maximize pt's level of independence in the home and community environment.     Pt's spiritual, cultural and educational needs considered and pt agreeable to plan of care and goals.     Anticipated barriers to  physical therapy: none    GOALS:   Short Term Goals:  4 weeks (Progressing, not met)  Increase range of motion 25%  Increase strength 1/2 muscle grade  Increase postural awareness to normal  Be able to perform HEP with minimal cueing required     Long Term Goals: 8 weeks (Progressing, not met)  Increase range of motion to 75%to 100% of full  Increase strength 1 muscle grade  Improve scapular stabilization to normal  Restore normal sleep habits without disturbances due to pain  Restore ability to drive without increased pain  Restore ability to participate in an exercise program for Wellness   Restore ability to perform ADL's and household activities independently and without increased pain  Pt to be independent with HEP to improve ROM and independence with ADL's    PLAN   Continue with established plan of care towards PT goals.      Maintain flexion principles  Consider cervical traction as needed  Consider levator scapulae stretch     Melonie Oconnor, PT

## 2024-12-23 ENCOUNTER — CLINICAL SUPPORT (OUTPATIENT)
Dept: REHABILITATION | Facility: HOSPITAL | Age: 44
End: 2024-12-23
Payer: COMMERCIAL

## 2024-12-23 DIAGNOSIS — M79.601 DIFFUSE PAIN IN RIGHT UPPER EXTREMITY: ICD-10-CM

## 2024-12-23 DIAGNOSIS — M54.2 NECK PAIN: Primary | ICD-10-CM

## 2024-12-23 DIAGNOSIS — M62.81 MUSCLE WEAKNESS: ICD-10-CM

## 2024-12-23 DIAGNOSIS — M54.12 CERVICAL RADICULOPATHY: ICD-10-CM

## 2024-12-23 PROCEDURE — 97140 MANUAL THERAPY 1/> REGIONS: CPT | Mod: PN | Performed by: PHYSICAL THERAPIST

## 2024-12-23 PROCEDURE — 97035 APP MDLTY 1+ULTRASOUND EA 15: CPT | Mod: PN | Performed by: PHYSICAL THERAPIST

## 2024-12-23 PROCEDURE — 97112 NEUROMUSCULAR REEDUCATION: CPT | Mod: PN | Performed by: PHYSICAL THERAPIST

## 2024-12-31 NOTE — PROGRESS NOTES
[facility]   Physical Therapy Treatment Note     Name: Kimberly Pearce  Clinic Number: [id number]    Therapy Diagnosis:   Encounter Diagnoses   Name Primary?    Neck pain Yes    Cervical radiculopathy     Diffuse pain in right upper extremity     Muscle weakness      Physician: Marina Whitfield PA-C    Visit Date: 1/2/2025    Physician Orders: PT Eval and Treat   Medical Diagnosis from Referral: M54.12 (ICD-10-CM) - Cervical radiculopathy   Evaluation Date: 12/3/2024  Authorization Period Expiration: 12-31-24  Plan of Care Expiration: 1-28-25  Progress Note Due: 1-28-25  Visit # / Visits authorized: 8/13     FOTO goal 66 11 visits  Foto  Date  Score    #1/3 12/3/2024 46   #2/3       #3/3             MD Follow up appointment: none scheduled            Precautions: scoliosis        PTA Visit #: 0/5     Time In: 11:03  Time Out: 11;55  Total Billable Time: 52  minutes    SUBJECTIVE     Pt reports: did ok on trip at worst 3/10 kept tape on levator the whole time so did help  Pt would get tired and go lie down at hotel   Pt was compliant with home exercise program.  Response to previous treatment: felt ok  Functional change: can sleep better with new pillow     Pain: 1/10 in neck to upper trap no scapula pain     Location: right side of neck into shoulder shooting pain to mid forearm R      MRI1.  There is degenerative change discussed in detail by level above.  There is no fracture.  These findings however are present in the setting of a spinal canal which may be borderline small on a developmental basis.  The most significant abnormality is present at the C5-6 level where there is a right paracentral disc extrusion with cephalad extension contributing to flattening of the right ventral cord surface, severe right lateral recess and proximal foraminal stenosis, moderate spinal stenosis.  Cord signal is grossly normal.  The study however is motion degraded which does limit evaluation of cord  signal.     2.  At the C6-7 level there is a broad left paracentral disc protrusion which flattens the left side of the cord and contributes to mild spinal stenosis and crowding of the left lateral recess.     3.  At the C4-5 level there is a broad right paracentral disc protrusion which flattens the right ventral cord surface and contributes to mild spinal canal and foraminal stenosis.       OBJECTIVE     In sitting  strength 30# R 45 L at last visit 25# R  45# L   Pt is R handed  Cervical rotation 70 B    Cervical ROM: (measured in degrees sitting) 1-2-25  - flexion -  45  stretch to neck                   - extension -  55 no pain                         - right side bending -  40 stretch to neck        - left side bending -  45   stretch to neck    - right rotation - 70  - left rotation - 70      Cervical ROM: (measured in degrees sitting) initial eval  - flexion -  45  stretching in neck                   - extension -  55   pain down to elbow                     - right side bending -  40        - left side bending -  45      - right rotation - 70 stretch neck  - left rotation - 70 stretch neck      Shoulder ROM: (measured in degrees standing) same as IE   Shoulder  RUE LUE   Active Flexion 170 170   Active Abduction 170 170      Slight instability noted at IE Scapular instability noted with elevation and abduction in standing with increased winging, lateral rotation and protraction of the R scapula.       Shoulder ROM: WNL    Muscle Strength 1-2-25  MMT R L   Elbow flexion 5-/5 5/5   Elbow extension 5-/5 5/5   Shoulder flexion 5-/5 5/5   Shoulder abduction 5-/5 5/5   Shoulder external rotation 4/5 5-/5   Shoulder internal rotation 4+/5 5/5         Muscle Strength initial eval   MMT R L   Elbow flexion 4+/5 5/5   Elbow extension 4+/5 5/5   Shoulder flexion 4+/5 5/5   Shoulder abduction 4+/5 5/5   Shoulder external rotation 4/5 4+/5   Shoulder internal rotation 4+/5 5/5           Upper trap 5/5 5/5      MMT  "R L   Cervical flexion 5/5 5/5   Cervical extension 5/5 5/5   Cervical sidebending 5/5 5/5      Endurance is not tested     Special tests: - alar and - compression  + response to flexion principles and able to progress to extension today without radicular symptoms      Palpation: mod tightness and TTP cervical paraspinals and suboccipitals     Joint mobility: N/T at IE difficulty relaxing to fully assess cervical mobility        Intake Outcome Measure for FOTO neck Survey     Therapist reviewed FOTO scores for Kimberly Pearce   FOTO documents entered into [employer] - see Media section or FOTO account episode details.     Intake Score: will perform at future session due to continued flexion restrictions with ADL at IE 46%       Treatment     Kimberly received the treatments listed below:      Therapeutic exercises were performed to improve ROM, strength, flexibility and stabilization for 25 minutes.     Reassessment   Bicep curl x 2# x 10  Tricep with TB and can consider pulley system that she has at home  ER with YTB x 10  IR with YTB x 10  Discussed return to work out with         Start with chin tuck and neck flexion perform all ex and scap stab and then end with chin tuck and neck flexion last continue q 2 hours flexion principles or at onset of increased symptoms   Cervical rotation x 10    Pt reports working with theraputty for      neuromuscular re-education activities to improve: posture and to decrease discogenic signs and symptoms for 15 minutes. The following activities were included:  Instructed pt further in proper posture and need to work on   Chin tuck x 10   Neck flexion x 10      Neck extension x 10  Then chin tuck and neck flexion again        Protraction x 20 supine with 2#   Pulldown with retraction with YTB x 15  Retraction with YTB x 15   ER with YTB x 20 B  Provided pt YTB for home use    Instructed pt to increase sets per day to help build endurance     HOLD Kinesiotape with 4" star for " pain relief was performed over the levator scapulae insertion on scapula R .  Instructed pt in use, care and precautions with tape.       [facility] shoulder taping #1,2,3 was performed to provide support to scapula and shoulder to decrease pain and improve functional use of UE.  Instructed pt in use, care and precautions with tape.       manual therapy techniques: soft tissue mobilization  were applied to the: neck and upper trap and levator scapulae and suboccipital for 12 minutes, including:  STM suboccipital release mod-severe tightness levator scapulae and min- mod suboccipital      HOLD Ultrasound  for pain control and to decrease inflammation @ continuous duty cycle, 1 Mhz, applied to R levator scapulae, intensity = 1.7 w/cm2 for 8 minutes.      (NP)therapeutic activities to improve functional performance for 0  minutes, including:    Instructed pt in role of levator scapulae with FH position and need to do more unloading   [place]enny cards but probably wrapping to keep to minimum     Reviewed sleep position and pt to borrow sister's pillow to try also Pt with another pillow that is better than previous pillow  Instructed pt further in unloading and to continue with q 2 hour ex or at onset of symptoms     Patient Education and Home Exercises     Home Exercises Provided and Patient Education Provided     Education provided:   - flexion/extension principles  - HEP   - stretch to point of tightness not pain   - exercise in pain free zone       Written Home Exercises Provided: continue prior HEP . Exercises were reviewed and Kimberly was able to demonstrate them prior to the end of the session.  Kimberly demonstrated good understanding of the education provided. See EMR under Patient Instructions for exercises provided during therapy sessions    ASSESSMENT   Patient demonstrates improvement in range of motion, strength, stabilization and function.    Pt able to progress to extension without increased pain Pt  appears to understand continued work on flexion principles and Bright protocol  Pt able to progress with UE strengthening without difficulty Pt tolerated treatment well and able to progress with strengthening and stabilization without adverse effect         Kimberly Is progressing well towards her goals.     Pt prognosis is Good.   Rehab potential:  Good    Pt will continue to benefit from skilled outpatient physical therapy to address the goals listed in the initial evaluation, provide pt/family education and to maximize pt's level of independence in the home and community environment.     Pt's spiritual, cultural and educational needs considered and pt agreeable to plan of care and goals.     Anticipated barriers to physical therapy: none    GOALS:   Short Term Goals:  4 weeks MET STG's  Increase range of motion 25%  Increase strength 1/2 muscle grade  Increase postural awareness to normal  Be able to perform HEP with minimal cueing required     Long Term Goals: 8 weeks (Progressing, not met)  Increase range of motion to 75%to 100% of full  Increase strength 1 muscle grade  Improve scapular stabilization to normal  Restore normal sleep habits without disturbances due to pain  Restore ability to drive without increased pain  Restore ability to participate in an exercise program for Wellness   Restore ability to perform ADL's and household activities independently and without increased pain  Pt to be independent with HEP to improve ROM and independence with ADL's    PLAN   Continue with established plan of care towards PT goals.      Maintain flexion principles  Consider cervical traction as needed  Consider levator scapulae stretch     Melonie Oconnor, PT

## 2025-01-02 ENCOUNTER — CLINICAL SUPPORT (OUTPATIENT)
Dept: REHABILITATION | Facility: HOSPITAL | Age: 45
End: 2025-01-02
Attending: PHYSICAL THERAPIST
Payer: COMMERCIAL

## 2025-01-02 DIAGNOSIS — M79.601 DIFFUSE PAIN IN RIGHT UPPER EXTREMITY: ICD-10-CM

## 2025-01-02 DIAGNOSIS — M54.12 CERVICAL RADICULOPATHY: ICD-10-CM

## 2025-01-02 DIAGNOSIS — M62.81 MUSCLE WEAKNESS: ICD-10-CM

## 2025-01-02 DIAGNOSIS — M54.2 NECK PAIN: Primary | ICD-10-CM

## 2025-01-02 PROCEDURE — 97112 NEUROMUSCULAR REEDUCATION: CPT | Mod: PN | Performed by: PHYSICAL THERAPIST

## 2025-01-02 PROCEDURE — 97140 MANUAL THERAPY 1/> REGIONS: CPT | Mod: PN | Performed by: PHYSICAL THERAPIST

## 2025-01-02 PROCEDURE — 97110 THERAPEUTIC EXERCISES: CPT | Mod: PN | Performed by: PHYSICAL THERAPIST

## 2025-01-02 NOTE — PLAN OF CARE
[facility]   Physical Therapy Treatment Note     Name: Kimberly Pearce  Clinic Number: [id number]    Therapy Diagnosis:   Encounter Diagnoses   Name Primary?    Neck pain Yes    Cervical radiculopathy     Diffuse pain in right upper extremity     Muscle weakness      Physician: Marina Whitfield PA-C    Visit Date: 1/2/2025    Physician Orders: PT Eval and Treat   Medical Diagnosis from Referral: M54.12 (ICD-10-CM) - Cervical radiculopathy   Evaluation Date: 12/3/2024  Authorization Period Expiration: 12-31-24  Plan of Care Expiration: 1-28-25  Progress Note Due: 1-28-25  Visit # / Visits authorized: 8/13     FOTO goal 66 11 visits  Foto  Date  Score    #1/3 12/3/2024 46   #2/3       #3/3             MD Follow up appointment: none scheduled            Precautions: scoliosis        PTA Visit #: 0/5     Time In: 11:03  Time Out: 11;55  Total Billable Time: 52  minutes    SUBJECTIVE     Pt reports: did ok on trip at worst 3/10 kept tape on levator the whole time so did help  Pt would get tired and go lie down at hotel   Pt was compliant with home exercise program.  Response to previous treatment: felt ok  Functional change: can sleep better with new pillow     Pain: 1/10 in neck to upper trap no scapula pain     Location: right side of neck into shoulder shooting pain to mid forearm R      MRI1.  There is degenerative change discussed in detail by level above.  There is no fracture.  These findings however are present in the setting of a spinal canal which may be borderline small on a developmental basis.  The most significant abnormality is present at the C5-6 level where there is a right paracentral disc extrusion with cephalad extension contributing to flattening of the right ventral cord surface, severe right lateral recess and proximal foraminal stenosis, moderate spinal stenosis.  Cord signal is grossly normal.  The study however is motion degraded which does limit evaluation of cord  signal.     2.  At the C6-7 level there is a broad left paracentral disc protrusion which flattens the left side of the cord and contributes to mild spinal stenosis and crowding of the left lateral recess.     3.  At the C4-5 level there is a broad right paracentral disc protrusion which flattens the right ventral cord surface and contributes to mild spinal canal and foraminal stenosis.       OBJECTIVE     In sitting  strength 30# R 45 L at last visit 25# R  45# L   Pt is R handed  Cervical rotation 70 B    Cervical ROM: (measured in degrees sitting) 1-2-25  - flexion -  45  stretch to neck                   - extension -  55 no pain                         - right side bending -  40 stretch to neck        - left side bending -  45   stretch to neck    - right rotation - 70  - left rotation - 70      Cervical ROM: (measured in degrees sitting) initial eval  - flexion -  45  stretching in neck                   - extension -  55   pain down to elbow                     - right side bending -  40        - left side bending -  45      - right rotation - 70 stretch neck  - left rotation - 70 stretch neck      Shoulder ROM: (measured in degrees standing) same as IE   Shoulder  RUE LUE   Active Flexion 170 170   Active Abduction 170 170      Slight instability noted at IE Scapular instability noted with elevation and abduction in standing with increased winging, lateral rotation and protraction of the R scapula.       Shoulder ROM: WNL    Muscle Strength 1-2-25  MMT R L   Elbow flexion 5-/5 5/5   Elbow extension 5-/5 5/5   Shoulder flexion 5-/5 5/5   Shoulder abduction 5-/5 5/5   Shoulder external rotation 4/5 5-/5   Shoulder internal rotation 4+/5 5/5         Muscle Strength initial eval   MMT R L   Elbow flexion 4+/5 5/5   Elbow extension 4+/5 5/5   Shoulder flexion 4+/5 5/5   Shoulder abduction 4+/5 5/5   Shoulder external rotation 4/5 4+/5   Shoulder internal rotation 4+/5 5/5           Upper trap 5/5 5/5      MMT  "R L   Cervical flexion 5/5 5/5   Cervical extension 5/5 5/5   Cervical sidebending 5/5 5/5      Endurance is not tested     Special tests: - alar and - compression  + response to flexion principles and able to progress to extension today without radicular symptoms      Palpation: mod tightness and TTP cervical paraspinals and suboccipitals     Joint mobility: N/T at IE difficulty relaxing to fully assess cervical mobility        Intake Outcome Measure for FOTO neck Survey     Therapist reviewed FOTO scores for Kimberly Pearce   FOTO documents entered into [email or web address] - see Media section or FOTO account episode details.     Intake Score: will perform at future session due to continued flexion restrictions with ADL at IE 46%       Treatment     Kimberly received the treatments listed below:      Therapeutic exercises were performed to improve ROM, strength, flexibility and stabilization for 25 minutes.     Reassessment   Bicep curl x 2# x 10  Tricep with TB and can consider pulley system that she has at home  ER with YTB x 10  IR with YTB x 10  Discussed return to work out with         Start with chin tuck and neck flexion perform all ex and scap stab and then end with chin tuck and neck flexion last continue q 2 hours flexion principles or at onset of increased symptoms   Cervical rotation x 10    Pt reports working with theraputty for      neuromuscular re-education activities to improve: posture and to decrease discogenic signs and symptoms for 15 minutes. The following activities were included:  Instructed pt further in proper posture and need to work on   Chin tuck x 10   Neck flexion x 10      Neck extension x 10  Then chin tuck and neck flexion again        Protraction x 20 supine with 2#   Pulldown with retraction with YTB x 15  Retraction with YTB x 15   ER with YTB x 20 B  Provided pt YTB for home use    Instructed pt to increase sets per day to help build endurance     HOLD Kinesiotape with 4" star for " pain relief was performed over the levator scapulae insertion on scapula R .  Instructed pt in use, care and precautions with tape.       [facility] shoulder taping #1,2,3 was performed to provide support to scapula and shoulder to decrease pain and improve functional use of UE.  Instructed pt in use, care and precautions with tape.       manual therapy techniques: soft tissue mobilization  were applied to the: neck and upper trap and levator scapulae and suboccipital for 12 minutes, including:  STM suboccipital release mod-severe tightness levator scapulae and min- mod suboccipital      HOLD Ultrasound  for pain control and to decrease inflammation @ continuous duty cycle, 1 Mhz, applied to R levator scapulae, intensity = 1.7 w/cm2 for 8 minutes.      (NP)therapeutic activities to improve functional performance for 0  minutes, including:    Instructed pt in role of levator scapulae with FH position and need to do more unloading   Kenny cards but probably wrapping to keep to minimum     Reviewed sleep position and pt to borrow sister's pillow to try also Pt with another pillow that is better than previous pillow  Instructed pt further in unloading and to continue with q 2 hour ex or at onset of symptoms     Patient Education and Home Exercises     Home Exercises Provided and Patient Education Provided     Education provided:   - flexion/extension principles  - HEP   - stretch to point of tightness not pain   - exercise in pain free zone       Written Home Exercises Provided: continue prior HEP . Exercises were reviewed and Kimberly was able to demonstrate them prior to the end of the session.  Kimberly demonstrated good understanding of the education provided. See EMR under Patient Instructions for exercises provided during therapy sessions    ASSESSMENT   Patient demonstrates improvement in range of motion, strength, stabilization and function.    Pt able to progress to extension without increased pain Pt  appears to understand continued work on flexion principles and Bright protocol  Pt able to progress with UE strengthening without difficulty Pt tolerated treatment well and able to progress with strengthening and stabilization without adverse effect         Kimberly Is progressing well towards her goals.     Pt prognosis is Good.   Rehab potential:  Good    Pt will continue to benefit from skilled outpatient physical therapy to address the goals listed in the initial evaluation, provide pt/family education and to maximize pt's level of independence in the home and community environment.     Pt's spiritual, cultural and educational needs considered and pt agreeable to plan of care and goals.     Anticipated barriers to physical therapy: none    GOALS:   Short Term Goals:  4 weeks MET STG's  Increase range of motion 25%  Increase strength 1/2 muscle grade  Increase postural awareness to normal  Be able to perform HEP with minimal cueing required     Long Term Goals: 8 weeks (Progressing, not met)  Increase range of motion to 75%to 100% of full  Increase strength 1 muscle grade  Improve scapular stabilization to normal  Restore normal sleep habits without disturbances due to pain  Restore ability to drive without increased pain  Restore ability to participate in an exercise program for Wellness   Restore ability to perform ADL's and household activities independently and without increased pain  Pt to be independent with HEP to improve ROM and independence with ADL's    PLAN   Continue with established plan of care towards PT goals.      Maintain flexion principles  Consider cervical traction as needed  Consider levator scapulae stretch     Melonie Oconnor, PT

## 2025-01-03 NOTE — PROGRESS NOTES
[facility]   Physical Therapy Treatment Note     Name: Kimberly Pearce  Clinic Number: [id number]    Therapy Diagnosis:   Encounter Diagnoses   Name Primary?    Neck pain Yes    Cervical radiculopathy     Diffuse pain in right upper extremity     Muscle weakness      Physician: Marina Whitfield PA-C    Visit Date: 1/6/2025    Physician Orders: PT Eval and Treat   Medical Diagnosis from Referral: M54.12 (ICD-10-CM) - Cervical radiculopathy   Evaluation Date: 12/3/2024  Authorization Period Expiration: 12-31-24  Plan of Care Expiration: 1-28-25  Progress Note Due: 1-28-25  Visit # / Visits authorized: 9/28     FOTO goal 66 11 visits  Foto  Date  Score    #1/3 12/3/2024 46   #2/3       #3/3             MD Follow up appointment: none scheduled            Precautions: scoliosis        PTA Visit #: 0/5     Time In: ***  Time Out: ***  Total Billable Time: 52  minutes    SUBJECTIVE     Pt reports: did ok on trip at worst 3/10 kept tape on levator the whole time so did help  Pt would get tired and go lie down at hotel   Pt was compliant with home exercise program.  Response to previous treatment: felt ok  Functional change: can sleep better with new pillow     Pain: 1/10 in neck to upper trap no scapula pain     Location: right side of neck into shoulder shooting pain to mid forearm R      MRI1.  There is degenerative change discussed in detail by level above.  There is no fracture.  These findings however are present in the setting of a spinal canal which may be borderline small on a developmental basis.  The most significant abnormality is present at the C5-6 level where there is a right paracentral disc extrusion with cephalad extension contributing to flattening of the right ventral cord surface, severe right lateral recess and proximal foraminal stenosis, moderate spinal stenosis.  Cord signal is grossly normal.  The study however is motion degraded which does limit evaluation of cord  signal.     2.  At the C6-7 level there is a broad left paracentral disc protrusion which flattens the left side of the cord and contributes to mild spinal stenosis and crowding of the left lateral recess.     3.  At the C4-5 level there is a broad right paracentral disc protrusion which flattens the right ventral cord surface and contributes to mild spinal canal and foraminal stenosis.       OBJECTIVE     In sitting  strength 30# R 45 L at last visit 25# R  45# L   Pt is R handed  Cervical rotation 70 B    Cervical ROM: (measured in degrees sitting) 1-2-25  - flexion -  45  stretch to neck                   - extension -  55 no pain                         - right side bending -  40 stretch to neck        - left side bending -  45   stretch to neck    - right rotation - 70  - left rotation - 70      Cervical ROM: (measured in degrees sitting) initial eval  - flexion -  45  stretching in neck                   - extension -  55   pain down to elbow                     - right side bending -  40        - left side bending -  45      - right rotation - 70 stretch neck  - left rotation - 70 stretch neck      Shoulder ROM: (measured in degrees standing) same as IE   Shoulder  RUE LUE   Active Flexion 170 170   Active Abduction 170 170      Slight instability noted at IE Scapular instability noted with elevation and abduction in standing with increased winging, lateral rotation and protraction of the R scapula.       Shoulder ROM: WNL    Muscle Strength 1-2-25  MMT R L   Elbow flexion 5-/5 5/5   Elbow extension 5-/5 5/5   Shoulder flexion 5-/5 5/5   Shoulder abduction 5-/5 5/5   Shoulder external rotation 4/5 5-/5   Shoulder internal rotation 4+/5 5/5         Muscle Strength initial eval   MMT R L   Elbow flexion 4+/5 5/5   Elbow extension 4+/5 5/5   Shoulder flexion 4+/5 5/5   Shoulder abduction 4+/5 5/5   Shoulder external rotation 4/5 4+/5   Shoulder internal rotation 4+/5 5/5           Upper trap 5/5 5/5      MMT  "R L   Cervical flexion 5/5 5/5   Cervical extension 5/5 5/5   Cervical sidebending 5/5 5/5      Endurance is not tested     Special tests: - alar and - compression  + response to flexion principles and able to progress to extension today without radicular symptoms      Palpation: mod tightness and TTP cervical paraspinals and suboccipitals     Joint mobility: N/T at IE difficulty relaxing to fully assess cervical mobility        Intake Outcome Measure for FOTO neck Survey     Therapist reviewed FOTO scores for Kimberly Pearce   FOTO documents entered into [employer] - see Media section or FOTO account episode details.     Intake Score: will perform at future session due to continued flexion restrictions with ADL at IE 46%       Treatment     Kimberly received the treatments listed below:      Therapeutic exercises were performed to improve ROM, strength, flexibility and stabilization for 25 minutes.     Reassessment   Bicep curl x 2# x 10  Tricep with TB and can consider pulley system that she has at home  ER with YTB x 10  IR with YTB x 10  Discussed return to work out with         Start with chin tuck and neck flexion perform all ex and scap stab and then end with chin tuck and neck flexion last continue q 2 hours flexion principles or at onset of increased symptoms   Cervical rotation x 10    Pt reports working with theraputty for      neuromuscular re-education activities to improve: posture and to decrease discogenic signs and symptoms for 15 minutes. The following activities were included:  Instructed pt further in proper posture and need to work on   Chin tuck x 10   Neck flexion x 10      Neck extension x 10  Then chin tuck and neck flexion again        Protraction x 20 supine with 2#   Pulldown with retraction with YTB x 15  Retraction with YTB x 15   ER with YTB x 20 B  Provided pt YTB for home use    Instructed pt to increase sets per day to help build endurance     HOLD Kinesiotape with 4" star for " pain relief was performed over the levator scapulae insertion on scapula R .  Instructed pt in use, care and precautions with tape.       [facility] shoulder taping #1,2,3 was performed to provide support to scapula and shoulder to decrease pain and improve functional use of UE.  Instructed pt in use, care and precautions with tape.       manual therapy techniques: soft tissue mobilization  were applied to the: neck and upper trap and levator scapulae and suboccipital for 12 minutes, including:  STM suboccipital release mod-severe tightness levator scapulae and min- mod suboccipital      HOLD Ultrasound  for pain control and to decrease inflammation @ continuous duty cycle, 1 Mhz, applied to R levator scapulae, intensity = 1.7 w/cm2 for 8 minutes.      (NP)therapeutic activities to improve functional performance for 0  minutes, including:    Instructed pt in role of levator scapulae with FH position and need to do more unloading   Kenny cards but probably wrapping to keep to minimum     Reviewed sleep position and pt to borrow sister's pillow to try also Pt with another pillow that is better than previous pillow  Instructed pt further in unloading and to continue with q 2 hour ex or at onset of symptoms     Patient Education and Home Exercises     Home Exercises Provided and Patient Education Provided     Education provided:   - flexion/extension principles  - HEP   - stretch to point of tightness not pain   - exercise in pain free zone       Written Home Exercises Provided: continue prior HEP . Exercises were reviewed and Kimberly was able to demonstrate them prior to the end of the session.  Kimberly demonstrated good understanding of the education provided. See EMR under Patient Instructions for exercises provided during therapy sessions    ASSESSMENT   Patient demonstrates improvement in range of motion, strength, stabilization and function.    Pt able to progress to extension without increased pain Pt  appears to understand continued work on flexion principles and Bright protocol  Pt able to progress with UE strengthening without difficulty Pt tolerated treatment well and able to progress with strengthening and stabilization without adverse effect         Kimberly Is progressing well towards her goals.     Pt prognosis is Good.   Rehab potential:  Good    Pt will continue to benefit from skilled outpatient physical therapy to address the goals listed in the initial evaluation, provide pt/family education and to maximize pt's level of independence in the home and community environment.     Pt's spiritual, cultural and educational needs considered and pt agreeable to plan of care and goals.     Anticipated barriers to physical therapy: none    GOALS:   Short Term Goals:  4 weeks MET STG's  Increase range of motion 25%  Increase strength 1/2 muscle grade  Increase postural awareness to normal  Be able to perform HEP with minimal cueing required     Long Term Goals: 8 weeks (Progressing, not met)  Increase range of motion to 75%to 100% of full  Increase strength 1 muscle grade  Improve scapular stabilization to normal  Restore normal sleep habits without disturbances due to pain  Restore ability to drive without increased pain  Restore ability to participate in an exercise program for Wellness   Restore ability to perform ADL's and household activities independently and without increased pain  Pt to be independent with HEP to improve ROM and independence with ADL's    PLAN   Continue with established plan of care towards PT goals.      Maintain flexion principles  Consider cervical traction as needed  Consider levator scapulae stretch     Melonie Oconnor, PT

## 2025-01-06 ENCOUNTER — CLINICAL SUPPORT (OUTPATIENT)
Dept: REHABILITATION | Facility: HOSPITAL | Age: 45
End: 2025-01-06
Payer: COMMERCIAL

## 2025-01-06 DIAGNOSIS — M54.2 NECK PAIN: Primary | ICD-10-CM

## 2025-01-06 DIAGNOSIS — M79.601 DIFFUSE PAIN IN RIGHT UPPER EXTREMITY: ICD-10-CM

## 2025-01-06 DIAGNOSIS — M62.81 MUSCLE WEAKNESS: ICD-10-CM

## 2025-01-06 DIAGNOSIS — M54.12 CERVICAL RADICULOPATHY: ICD-10-CM

## 2025-01-06 PROCEDURE — 97140 MANUAL THERAPY 1/> REGIONS: CPT | Mod: PN | Performed by: PHYSICAL THERAPIST

## 2025-01-06 PROCEDURE — 97112 NEUROMUSCULAR REEDUCATION: CPT | Mod: PN | Performed by: PHYSICAL THERAPIST

## 2025-01-06 NOTE — PROGRESS NOTES
[facility]   Physical Therapy Treatment Note     Name: Kimberly Pearce  Clinic Number: [id number]    Therapy Diagnosis:   Encounter Diagnoses   Name Primary?    Neck pain Yes    Cervical radiculopathy     Diffuse pain in right upper extremity     Muscle weakness      Physician: Marina Whitfield PA-C    Visit Date: 1/6/2025    Physician Orders: PT Eval and Treat   Medical Diagnosis from Referral: M54.12 (ICD-10-CM) - Cervical radiculopathy   Evaluation Date: 12/3/2024  Authorization Period Expiration: 12-31-24  Plan of Care Expiration: 1-28-25  Progress Note Due: 1-28-25  Visit # / Visits authorized: 9/28     FOTO goal 66 11 visits  Foto  Date  Score    #1/3 12/3/2024 46   #2/3       #3/3             MD Follow up appointment: none scheduled            Precautions: scoliosis        PTA Visit #: 0/5     Time In: 9:10  Time Out: 9:50  Total Billable Time:  36 minutes    SUBJECTIVE     Pt reports: overall feeling good  been waking up on R side and pain in R upper trap and levator, feels may have missed tape  Pt was compliant with home exercise program.  Response to previous treatment: felt ok  Functional change: can sleep better with new pillow     Pain: 1/10 in  upper trap to levator  no scapula pain     Location: right side of neck into shoulder shooting pain to mid forearm R      MRI1.  There is degenerative change discussed in detail by level above.  There is no fracture.  These findings however are present in the setting of a spinal canal which may be borderline small on a developmental basis.  The most significant abnormality is present at the C5-6 level where there is a right paracentral disc extrusion with cephalad extension contributing to flattening of the right ventral cord surface, severe right lateral recess and proximal foraminal stenosis, moderate spinal stenosis.  Cord signal is grossly normal.  The study however is motion degraded which does limit evaluation of cord signal.    "  2.  At the C6-7 level there is a broad left paracentral disc protrusion which flattens the left side of the cord and contributes to mild spinal stenosis and crowding of the left lateral recess.     3.  At the C4-5 level there is a broad right paracentral disc protrusion which flattens the right ventral cord surface and contributes to mild spinal canal and foraminal stenosis.       OBJECTIVE     No objective measures this date      Treatment     Kimberly received the treatments listed below:      Therapeutic exercises were performed to improve ROM, strength, flexibility and stabilization for 3 minutes.        Bicep curl x 2# x 10  Tricep with TB and can consider pulley system that she has at home     Start with chin tuck and neck flexion perform all ex and scap stab and then end with chin tuck and neck flexion last continue q 2 hours flexion principles or at onset of increased symptoms   Cervical rotation x 10    Pt reports working with theraputty for      neuromuscular re-education activities to improve: posture and to decrease discogenic signs and symptoms for 23 minutes. The following activities were included:  Instructed pt further in proper posture and need to work on   Chin tuck x 10   Neck flexion x 10     Neck extension x 5  Then chin tuck and neck flexion again x 5       Protraction x 30 supine with 2#    4# next session  Pulldown with retraction with YTB x 15  Retraction with YTB x 12   ER with YTB x 15 B  IR with YTB x 15  Provided pt YTB for home use    Instructed pt to increase sets per day to help build endurance     Kinesiotape with 4" star for pain relief was performed over the levator scapulae insertion on scapula R .  Instructed pt in use, care and precautions with tape.       [facility] shoulder taping #1,2,3 was performed to provide support to scapula and shoulder to decrease pain and improve functional use of UE.  Instructed pt in use, care and precautions with tape.       manual therapy " techniques: soft tissue mobilization  were applied to the: neck and upper trap and levator scapulae and suboccipital for 10 direct minutes, including:  STM suboccipital release mod-severe tightness levator scapulae and min- mod suboccipital      HOLD Ultrasound  for pain control and to decrease inflammation @ continuous duty cycle, 1 Mhz, applied to R levator scapulae, intensity = 1.7 w/cm2 for 8 minutes.      (NP)therapeutic activities to improve functional performance for 0  minutes, including:    Instructed pt in role of levator scapulae with FH position and need to do more unloading   [place] cards but probably wrapping to keep to minimum     Reviewed sleep position and pt to borrow sister's pillow to try also Pt with another pillow that is better than previous pillow  Instructed pt further in unloading and to continue with q 2 hour ex or at onset of symptoms     Patient Education and Home Exercises     Home Exercises Provided and Patient Education Provided     Education provided:   - flexion/extension principles  - HEP   - stretch to point of tightness not pain   - exercise in pain free zone       Written Home Exercises Provided: continue prior HEP . Exercises were reviewed and Kimberly was able to demonstrate them prior to the end of the session.  Kimberly demonstrated good understanding of the education provided. See EMR under Patient Instructions for exercises provided during therapy sessions    ASSESSMENT   Pt able to progress slowly with extension Pt appears to understand continued work on flexion principles and Bright protocol Pt with mod tightness and may benefit  Pt tolerated treatment well and able to progress with strengthening and stabilization without adverse effect         Kimberly Is progressing well towards her goals.     Pt prognosis is Good.   Rehab potential:  Good    Pt will continue to benefit from skilled outpatient physical therapy to address the goals listed in the initial evaluation,  provide pt/family education and to maximize pt's level of independence in the home and community environment.     Pt's spiritual, cultural and educational needs considered and pt agreeable to plan of care and goals.     Anticipated barriers to physical therapy: none    GOALS:   Short Term Goals:  4 weeks MET STG's  Increase range of motion 25%  Increase strength 1/2 muscle grade  Increase postural awareness to normal  Be able to perform HEP with minimal cueing required     Long Term Goals: 8 weeks (Progressing, not met)  Increase range of motion to 75%to 100% of full  Increase strength 1 muscle grade  Improve scapular stabilization to normal  Restore normal sleep habits without disturbances due to pain  Restore ability to drive without increased pain  Restore ability to participate in an exercise program for Wellness   Restore ability to perform ADL's and household activities independently and without increased pain  Pt to be independent with HEP to improve ROM and independence with ADL's    PLAN   Continue with established plan of care towards PT goals.      Maintain flexion principles  Consider cervical traction as needed  Consider levator scapulae stretch     Melonie Oconnor, PT

## 2025-01-09 ENCOUNTER — OFFICE VISIT (OUTPATIENT)
Dept: NEUROSURGERY | Facility: CLINIC | Age: 45
End: 2025-01-09
Payer: COMMERCIAL

## 2025-01-09 VITALS
HEIGHT: 63 IN | BODY MASS INDEX: 21.79 KG/M2 | SYSTOLIC BLOOD PRESSURE: 154 MMHG | WEIGHT: 123 LBS | HEART RATE: 93 BPM | DIASTOLIC BLOOD PRESSURE: 97 MMHG | RESPIRATION RATE: 18 BRPM

## 2025-01-09 DIAGNOSIS — M54.12 CERVICAL RADICULOPATHY: ICD-10-CM

## 2025-01-09 DIAGNOSIS — M41.9 SCOLIOSIS OF THORACOLUMBAR SPINE, UNSPECIFIED SCOLIOSIS TYPE: Primary | ICD-10-CM

## 2025-01-09 PROCEDURE — 1159F MED LIST DOCD IN RCRD: CPT | Mod: CPTII,S$GLB,, | Performed by: STUDENT IN AN ORGANIZED HEALTH CARE EDUCATION/TRAINING PROGRAM

## 2025-01-09 PROCEDURE — 3008F BODY MASS INDEX DOCD: CPT | Mod: CPTII,S$GLB,, | Performed by: STUDENT IN AN ORGANIZED HEALTH CARE EDUCATION/TRAINING PROGRAM

## 2025-01-09 PROCEDURE — 3077F SYST BP >= 140 MM HG: CPT | Mod: CPTII,S$GLB,, | Performed by: STUDENT IN AN ORGANIZED HEALTH CARE EDUCATION/TRAINING PROGRAM

## 2025-01-09 PROCEDURE — 99214 OFFICE O/P EST MOD 30 MIN: CPT | Mod: S$GLB,,, | Performed by: STUDENT IN AN ORGANIZED HEALTH CARE EDUCATION/TRAINING PROGRAM

## 2025-01-09 PROCEDURE — 3080F DIAST BP >= 90 MM HG: CPT | Mod: CPTII,S$GLB,, | Performed by: STUDENT IN AN ORGANIZED HEALTH CARE EDUCATION/TRAINING PROGRAM

## 2025-01-09 NOTE — PROGRESS NOTES
[facility]   Physical Therapy Treatment Note     Name: Kimberly Pearce  Clinic Number: [id number]    Therapy Diagnosis:   Encounter Diagnoses   Name Primary?    Neck pain Yes    Cervical radiculopathy     Diffuse pain in right upper extremity     Muscle weakness      Physician: Marina Whitfield PA-C    Visit Date: 1/10/2025    Physician Orders: PT Eval and Treat   Medical Diagnosis from Referral: M54.12 (ICD-10-CM) - Cervical radiculopathy   Evaluation Date: 12/3/2024  Authorization Period Expiration: 12-31-24  Plan of Care Expiration: 1-28-25  Progress Note Due: 1-28-25  Visit # / Visits authorized: 10/28     FOTO goal 66 11 visits  Foto  Date  Score    #1/3 12/3/2024 46   #2/3       #3/3             MD Follow up appointment: none scheduled            Precautions: scoliosis        PTA Visit #: 0/5     Time In: 9:04  Time Out: 9:50  Total Billable Time:  41 direct minutes    SUBJECTIVE     Pt reports: saw MD and he felt may heal in next 4-6 months Tape helping and feeling great and no pain Pt reports she will continue to avoid heavy lifting and watch neck positioning   Pt was compliant with home exercise program.  Response to previous treatment: felt ok  Functional change: can sleep better with new pillow     Pain: 0/10 in  upper trap to levator  no scapula pain     Location: right side of neck into shoulder shooting pain to mid forearm R      MRI1.  There is degenerative change discussed in detail by level above.  There is no fracture.  These findings however are present in the setting of a spinal canal which may be borderline small on a developmental basis.  The most significant abnormality is present at the C5-6 level where there is a right paracentral disc extrusion with cephalad extension contributing to flattening of the right ventral cord surface, severe right lateral recess and proximal foraminal stenosis, moderate spinal stenosis.  Cord signal is grossly normal.  The study however  "is motion degraded which does limit evaluation of cord signal.     2.  At the C6-7 level there is a broad left paracentral disc protrusion which flattens the left side of the cord and contributes to mild spinal stenosis and crowding of the left lateral recess.     3.  At the C4-5 level there is a broad right paracentral disc protrusion which flattens the right ventral cord surface and contributes to mild spinal canal and foraminal stenosis.       OBJECTIVE     R 45# L 50#     Mod tightness upper trap, min-0 in levator  min-mod in suboccipital, improved after manual therapy      Treatment     Kimberly received the treatments listed below:      Therapeutic exercises were performed to improve ROM, strength, flexibility and stabilization for 15 minutes.      Pt to contact  and initiate lower body  provided pt further recommendations and precautions     Start with chin tuck and neck flexion perform all ex and scap stab and then end with chin tuck and neck flexion last continue q 2 hours flexion principles or at onset of increased symptoms   Cervical rotation x 10    Pt reports working with theraputty for      Bicep curl x 3# x 10  Tricep with GTB and pulley system at home  Chest press x 10 4#         neuromuscular re-education activities to improve: posture and to decrease discogenic signs and symptoms for 16 minutes. The following activities were included:  Instructed pt further in proper posture and need to work on   Chin tuck x 10   Neck flexion x 10     Neck extension x 5  Then chin tuck and neck flexion again x 5       Protraction x 30 supine with 4#     Pulldown with retraction with YTB x 20  Retraction with YTB x 20   ER with YTB x 20B  IR with YTB x 15  Provided pt YTB for home use    Instructed pt to increase sets per day to help build endurance     Kinesiotape with 4" star for pain relief was performed over the levator scapulae insertion on scapula R . Added 8" upper trap inhibition tape and will " note response  Instructed pt in use, care and precautions with tape.       [facility] was performed to provide support to scapula and shoulder to decrease pain and improve functional use of UE.  Instructed pt in use, care and precautions with tape.       manual therapy techniques: soft tissue mobilization  were applied to the: neck and upper trap and levator scapulae and suboccipital for 10 direct minutes, including:  STM suboccipital release       HOLD Ultrasound  for pain control and to decrease inflammation @ continuous duty cycle, 1 Mhz, applied to R levator scapulae, intensity = 1.7 w/cm2 for 8 minutes.      (NP)therapeutic activities to improve functional performance for 0  minutes, including:    Instructed pt in role of levator scapulae with FH position and need to do more unloading   [place] cards but probably wrapping to keep to minimum     Reviewed sleep position and pt to borrow sister's pillow to try also Pt with another pillow that is better than previous pillow  Instructed pt further in unloading and to continue with q 2 hour ex or at onset of symptoms     Patient Education and Home Exercises     Home Exercises Provided and Patient Education Provided     Education provided:   - flexion/extension principles  - HEP   - stretch to point of tightness not pain   - exercise in pain free zone       Written Home Exercises Provided: continue prior HEP . Exercises were reviewed and Kimberly was able to demonstrate them prior to the end of the session.  Kimberly demonstrated good understanding of the education provided. See EMR under Patient Instructions for exercises provided during therapy sessions    ASSESSMENT   Pt feeling good and tape being effective on levator scap.  Pt may benefit from tape to upper trap  Pt doing well with extension and appears to understand need to avoid prolonged activities in extension or with rotation and to avoid heavy lifting Pt with improving  strength  R  Pt tolerated treatment well and able to progress with strengthening and stabilization without adverse effect     Kimberly Is progressing well towards her goals.     Pt prognosis is Good.   Rehab potential:  Good    Pt will continue to benefit from skilled outpatient physical therapy to address the goals listed in the initial evaluation, provide pt/family education and to maximize pt's level of independence in the home and community environment.     Pt's spiritual, cultural and educational needs considered and pt agreeable to plan of care and goals.     Anticipated barriers to physical therapy: none    GOALS:   Short Term Goals:  4 weeks MET STG's  Increase range of motion 25%  Increase strength 1/2 muscle grade  Increase postural awareness to normal  Be able to perform HEP with minimal cueing required     Long Term Goals: 8 weeks (Progressing, not met)  Increase range of motion to 75%to 100% of full  Increase strength 1 muscle grade  Improve scapular stabilization to normal  Restore normal sleep habits without disturbances due to pain  Restore ability to drive without increased pain  Restore ability to participate in an exercise program for Wellness   Restore ability to perform ADL's and household activities independently and without increased pain  Pt to be independent with HEP to improve ROM and independence with ADL's    PLAN   Continue with established plan of care towards PT goals.      Maintain flexion principles  Consider cervical traction as needed  assess response to additional taping assess response to upper trap taping     Melonie Oconnor, PT

## 2025-01-09 NOTE — PROGRESS NOTES
[facility]   Physical Therapy Treatment Note     Name: Kimberly Pearce  Clinic Number: [id number]    Therapy Diagnosis:   Encounter Diagnoses   Name Primary?    Neck pain Yes    Cervical radiculopathy     Diffuse pain in right upper extremity     Muscle weakness      Physician: Marina Whitfield PA-C    Visit Date: 1/10/2025    Physician Orders: PT Eval and Treat   Medical Diagnosis from Referral: M54.12 (ICD-10-CM) - Cervical radiculopathy   Evaluation Date: 12/3/2024  Authorization Period Expiration: 12-31-24  Plan of Care Expiration: 1-28-25  Progress Note Due: 1-28-25  Visit # / Visits authorized: 10/28     FOTO goal 66 11 visits  Foto  Date  Score    #1/3 12/3/2024 46   #2/3       #3/3             MD Follow up appointment: none scheduled            Precautions: scoliosis        PTA Visit #: 0/5     Time In: 9:04  Time Out: 9:50  Total Billable Time:  41 direct minutes    SUBJECTIVE     Pt reports: saw MD and he felt may heal in next 4-6 months Tape helping and feeling great and no pain Pt reports she will continue to avoid heavy lifting and watch neck positioning   Pt was compliant with home exercise program.  Response to previous treatment: felt ok  Functional change: can sleep better with new pillow     Pain: 0/10 in  upper trap to levator  no scapula pain     Location: right side of neck into shoulder shooting pain to mid forearm R      MRI1.  There is degenerative change discussed in detail by level above.  There is no fracture.  These findings however are present in the setting of a spinal canal which may be borderline small on a developmental basis.  The most significant abnormality is present at the C5-6 level where there is a right paracentral disc extrusion with cephalad extension contributing to flattening of the right ventral cord surface, severe right lateral recess and proximal foraminal stenosis, moderate spinal stenosis.  Cord signal is grossly normal.  The study however  "is motion degraded which does limit evaluation of cord signal.     2.  At the C6-7 level there is a broad left paracentral disc protrusion which flattens the left side of the cord and contributes to mild spinal stenosis and crowding of the left lateral recess.     3.  At the C4-5 level there is a broad right paracentral disc protrusion which flattens the right ventral cord surface and contributes to mild spinal canal and foraminal stenosis.       OBJECTIVE     R 45# L 50#     Mod tightness upper trap, min-0 in levator  min-mod in suboccipital, improved after manual therapy      Treatment     Kimberly received the treatments listed below:      Therapeutic exercises were performed to improve ROM, strength, flexibility and stabilization for 15 minutes.      Pt to contact  and initiate lower body  provided pt further recommendations and precautions   Bicep curl x 2# x 10  Tricep with TB and pulley system at home  Chest press x 10 4#     Start with chin tuck and neck flexion perform all ex and scap stab and then end with chin tuck and neck flexion last continue q 2 hours flexion principles or at onset of increased symptoms   Cervical rotation x 10    Pt reports working with theraputty for      neuromuscular re-education activities to improve: posture and to decrease discogenic signs and symptoms for 16 minutes. The following activities were included:  Instructed pt further in proper posture and need to work on   Chin tuck x 10   Neck flexion x 10     Neck extension x 5  Then chin tuck and neck flexion again x 5       Protraction x 30 supine with 2#    4# next session  Pulldown with retraction with YTB x 15  Retraction with YTB x 12   ER with YTB x 15 B  IR with YTB x 15  Provided pt YTB for home use    Instructed pt to increase sets per day to help build endurance     Kinesiotape with 4" star for pain relief was performed over the levator scapulae insertion on scapula R . Added 8" upper trap inhibition tape " and will note response  Instructed pt in use, care and precautions with tape.       [facility] shoulder taping #1,2,3 was performed to provide support to scapula and shoulder to decrease pain and improve functional use of UE.  Instructed pt in use, care and precautions with tape.       manual therapy techniques: soft tissue mobilization  were applied to the: neck and upper trap and levator scapulae and suboccipital for 10 direct minutes, including:  STM suboccipital release       HOLD Ultrasound  for pain control and to decrease inflammation @ continuous duty cycle, 1 Mhz, applied to R levator scapulae, intensity = 1.7 w/cm2 for 8 minutes.      (NP)therapeutic activities to improve functional performance for 0  minutes, including:    Instructed pt in role of levator scapulae with FH position and need to do more unloading   Kenny cards but probably wrapping to keep to minimum     Reviewed sleep position and pt to borrow sister's pillow to try also Pt with another pillow that is better than previous pillow  Instructed pt further in unloading and to continue with q 2 hour ex or at onset of symptoms     Patient Education and Home Exercises     Home Exercises Provided and Patient Education Provided     Education provided:   - flexion/extension principles  - HEP   - stretch to point of tightness not pain   - exercise in pain free zone       Written Home Exercises Provided: continue prior HEP . Exercises were reviewed and Kimberly was able to demonstrate them prior to the end of the session.  Kimberly demonstrated good understanding of the education provided. See EMR under Patient Instructions for exercises provided during therapy sessions    ASSESSMENT   Pt feeling good and tape being effective on levator scap.  Pt may benefit from tape to upper trap  Pt doing well with extension and appears to understand need to avoid prolonged activities in extension or with rotation and to avoid heavy lifting Pt with improving   strength R  Pt tolerated treatment well and able to progress with strengthening and stabilization without adverse effect     Kimberly Is progressing well towards her goals.     Pt prognosis is Good.   Rehab potential:  Good    Pt will continue to benefit from skilled outpatient physical therapy to address the goals listed in the initial evaluation, provide pt/family education and to maximize pt's level of independence in the home and community environment.     Pt's spiritual, cultural and educational needs considered and pt agreeable to plan of care and goals.     Anticipated barriers to physical therapy: none    GOALS:   Short Term Goals:  4 weeks MET STG's  Increase range of motion 25%  Increase strength 1/2 muscle grade  Increase postural awareness to normal  Be able to perform HEP with minimal cueing required     Long Term Goals: 8 weeks (Progressing, not met)  Increase range of motion to 75%to 100% of full  Increase strength 1 muscle grade  Improve scapular stabilization to normal  Restore normal sleep habits without disturbances due to pain  Restore ability to drive without increased pain  Restore ability to participate in an exercise program for Wellness   Restore ability to perform ADL's and household activities independently and without increased pain  Pt to be independent with HEP to improve ROM and independence with ADL's    PLAN   Continue with established plan of care towards PT goals.      Maintain flexion principles  Consider cervical traction as needed  assess response to additional taping assess response to upper trap taping     Melonie Oconnor, PT

## 2025-01-09 NOTE — PROGRESS NOTES
Neurosurgery History and Physical    Patient ID: Kimberly Pearce is a 44 y.o. female.    Chief Complaint   Patient presents with    Follow-up   Doing better no significant symptoms  In PT    Impression:     1.  There is degenerative change discussed in detail by level above.  There is no fracture.  These findings however are present in the setting of a spinal canal which may be borderline small on a developmental basis.  The most significant abnormality is present at the C5-6 level where there is a right paracentral disc extrusion with cephalad extension contributing to flattening of the right ventral cord surface, severe right lateral recess and proximal foraminal stenosis, moderate spinal stenosis.  Cord signal is grossly normal.  The study however is motion degraded which does limit evaluation of cord signal.     2.  At the C6-7 level there is a broad left paracentral disc protrusion which flattens the left side of the cord and contributes to mild spinal stenosis and crowding of the left lateral recess.     3.  At the C4-5 level there is a broad right paracentral disc protrusion which flattens the right ventral cord surface and contributes to mild spinal canal and foraminal stenosis.        Electronically signed by:Riccardo Ma MD  Date:                                            12/04/2024  Time:                                           10:2        HPI 11/18/24  Patient is a 44 year old female who presents today to establish care regarding her right sided neck and arm pain that started 1 week ago without fall or injury. Her pain is described as electrical shocking from the right neck down the right arm and into the forearm. She has shooting pain when she lifts the arm above 90 degrees or while driving. Her whole hand and arm will go numb if she lays on it, this resolves with positional changes. She denies any changes in her balance, handwriting, dropping things or changes in her fine motor skills. She has  tried exercises on her own, heat/ice, advil, and a pain pill from previous surgery without relief. She normally works out performing Pilates, but has been resting from her recent surgery.     She has not had any previous surgeries or procedures regarding her neck.     Review of Systems   Musculoskeletal:  Positive for neck pain. Negative for gait problem.   Neurological:  Positive for numbness. Negative for weakness.       Past Medical History:   Diagnosis Date    Abnormal Pap smear     Abnormal Pap smear of vagina     Allergies     MVP (mitral valve prolapse)     PONV (postoperative nausea and vomiting)     Scoliosis     Uterine fibroid      Social History     Socioeconomic History    Marital status:    Tobacco Use    Smoking status: Never    Smokeless tobacco: Never   Substance and Sexual Activity    Alcohol use: Yes     Alcohol/week: 4.0 standard drinks of alcohol     Types: 4 Standard drinks or equivalent per week     Comment: casual weekends    Drug use: No    Sexual activity: Yes     Partners: Male     Birth control/protection: OCP     Family History   Problem Relation Name Age of Onset    Diabetes Maternal Grandmother      Depression Father      Hyperlipidemia Mother      Breast cancer Neg Hx      Ovarian cancer Neg Hx      Uterine cancer Neg Hx      Colon cancer Neg Hx       Review of patient's allergies indicates:  No Known Allergies    Current Outpatient Medications:     ACZONE 7.5 % GlwP, as needed., Disp: , Rfl:     azelastine (ASTELIN) 137 mcg (0.1 %) nasal spray, as needed., Disp: , Rfl:     ethynodiol-ethinyl estradiol (KELNOR) 1-35 mg-mcg per tablet, Take 1 tablet by mouth once daily. (Patient taking differently: Take 1 tablet by mouth every evening.), Disp: 30 tablet, Rfl: 6    ferrous sulfate (FEOSOL) 325 mg (65 mg iron) Tab tablet, Take 325 mg by mouth daily with breakfast., Disp: , Rfl:     ibuprofen (ADVIL,MOTRIN) 800 MG tablet, Take 1 tablet (800 mg total) by mouth every 8 (eight) hours  "as needed for Pain., Disp: 30 tablet, Rfl: 1    montelukast (SINGULAIR) 10 mg tablet, Take 10 mg by mouth every evening., Disp: , Rfl:     multivitamin capsule, Take 1 capsule by mouth once daily., Disp: , Rfl:     ondansetron (ZOFRAN-ODT) 4 MG TbDL, Take 2 tablets (8 mg total) by mouth every 8 (eight) hours as needed., Disp: 12 tablet, Rfl: 0    polyethylene glycol (GLYCOLAX) 17 gram/dose powder, Take 17 g by mouth once daily. In 8 oz of water, Disp: 240 g, Rfl: 1    spironolactone (ALDACTONE) 50 MG tablet, Take 50 mg by mouth once daily., Disp: , Rfl:     vitamin D (VITAMIN D3) 1000 units Tab, Take 1,000 Units by mouth once daily., Disp: , Rfl:     oxyCODONE-acetaminophen (PERCOCET) 5-325 mg per tablet, Take 1 tablet by mouth every 4 (four) hours as needed for Pain. (Patient not taking: Reported on 1/9/2025), Disp: 30 tablet, Rfl: 0  No current facility-administered medications for this visit.    Facility-Administered Medications Ordered in Other Visits:     albuterol nebulizer solution 2.5 mg, 2.5 mg, Nebulization, Q15 Min PRN, Oleg Webster MD    albuterol-ipratropium 2.5 mg-0.5 mg/3 mL nebulizer solution 3 mL, 3 mL, Nebulization, PRN, Oleg Webster MD    diphenhydrAMINE injection 25 mg, 25 mg, Intravenous, Q6H PRN, Oleg Webster MD    HYDROmorphone injection 0.5 mg, 0.5 mg, Intravenous, Q5 Min PRN, Oleg Webster MD    hyoscyamine ODT 0.25 mg, 0.25 mg, Sublingual, Once, Oleg Webster MD    ondansetron injection 4 mg, 4 mg, Intravenous, Q15 Min PRN, Oleg Webster MD    sodium chloride 0.9% flush 3 mL, 3 mL, Intravenous, Q8H, Oleg Webster MD    sodium chloride 0.9% flush 3 mL, 3 mL, Intravenous, PRN, Oleg Webster MD  Resp. rate 18, height 5' 3" (1.6 m), weight 55.8 kg (123 lb 0.3 oz).      Neurological Exam  Mental Status  Awake, alert and oriented to person, place and time.    Motor                                               Right                     Left  Deltoid              "                      5                          5   Biceps                                   5                          5   Triceps                                  4+                          5   Wrist flexor                            5                          5   Wrist extensor                       5                          5   Interossei                              5                          5   Iliopsoas                               5                          5   Quadriceps                           5                          5  Ankle dorsiflexor                   5                          5  Ankle plantar flexor              5                           5    Sensory  Sensation is intact to light touch, pinprick, vibration and proprioception in all four extremities.    Reflexes                                            Right                      Left  Biceps                                 2+                         2+  Triceps                                2+                         2+  Patellar                                2+                         2+  Achilles                                2+                         2+    Right pathological reflexes: Rodrick's present. Ankle clonus present. 2 beats.  Left pathological reflexes: Rodrick's absent. Ankle clonus present. 2 beats.    Coordination  Right: Rapid alternating movement normal.Left: Rapid alternating movement normal.    Gait  Normal casual, toe, heel and tandem gait. Romberg is absent.  Negative tinels at wrist and elbows bilaterally  No atrophy of arms or hands.      Physical Exam  Vitals and nursing note reviewed.   Neurological:      Coordination: Romberg sign negative.      Deep Tendon Reflexes:      Reflex Scores:       Tricep reflexes are 2+ on the right side and 2+ on the left side.       Bicep reflexes are 2+ on the right side and 2+ on the left side.       Patellar reflexes are 2+ on the right side and 2+ on the left side.        "Achilles reflexes are 2+ on the right side and 2+ on the left side.     Comments: Negative tinels at wrist and elbows bilaterally  No atrophy of arms or hands       Vital Signs  Resp: 18  Pain Score:   1  Pain Loc: Neck  Height and Weight  Height: 5' 3" (160 cm)  Weight: 55.8 kg (123 lb 0.3 oz)  BSA (Calculated - sq m): 1.57 sq meters  BMI (Calculated): 21.8  Weight in (lb) to have BMI = 25: 140.8]    Provider dictation:  44-year-old female  Who had a neck pain with right-sided radiculopathy  She had high frequency and severity of symptoms  She is doing very well now with minimal symptoms, neurologically intact  Reviewed her imaging which shows some kyphosis a foraminal disc herniation at C5-6 and a disc osteophyte impressing of the thecal sac on the left proximal foraminal stenosis at C6-7.    Clinically she is doing well she had significant improvement with physical therapy  She would like to continue this at this time with no further symptomatic treatment  We will have a 6 week follow up            Visit Diagnosis:  There are no diagnoses linked to this encounter.        "

## 2025-01-10 ENCOUNTER — CLINICAL SUPPORT (OUTPATIENT)
Dept: REHABILITATION | Facility: HOSPITAL | Age: 45
End: 2025-01-10
Payer: COMMERCIAL

## 2025-01-10 DIAGNOSIS — M79.601 DIFFUSE PAIN IN RIGHT UPPER EXTREMITY: ICD-10-CM

## 2025-01-10 DIAGNOSIS — M54.12 CERVICAL RADICULOPATHY: ICD-10-CM

## 2025-01-10 DIAGNOSIS — M62.81 MUSCLE WEAKNESS: ICD-10-CM

## 2025-01-10 DIAGNOSIS — M54.2 NECK PAIN: Primary | ICD-10-CM

## 2025-01-10 PROCEDURE — 97110 THERAPEUTIC EXERCISES: CPT | Mod: PN | Performed by: PHYSICAL THERAPIST

## 2025-01-10 PROCEDURE — 97140 MANUAL THERAPY 1/> REGIONS: CPT | Mod: PN | Performed by: PHYSICAL THERAPIST

## 2025-01-10 PROCEDURE — 97112 NEUROMUSCULAR REEDUCATION: CPT | Mod: PN | Performed by: PHYSICAL THERAPIST

## 2025-01-13 NOTE — PROGRESS NOTES
[facility]   Physical Therapy Treatment Note     Name: Kimberly Pearce  Clinic Number: [id number]    Therapy Diagnosis:   Encounter Diagnoses   Name Primary?    Neck pain Yes    Cervical radiculopathy     Diffuse pain in right upper extremity     Muscle weakness      Physician: Marina Whitfield PA-C    Visit Date: 1/14/2025    Physician Orders: PT Eval and Treat   Medical Diagnosis from Referral: M54.12 (ICD-10-CM) - Cervical radiculopathy   Evaluation Date: 12/3/2024  Authorization Period Expiration: 12-31-24  Plan of Care Expiration: 1-28-25  Progress Note Due: 1-28-25  Visit # / Visits authorized: 11/28     FOTO goal 66 11 visits  Foto  Date  Score    #1/3 12/3/2024 46   #2/3       #3/3             MD Follow up appointment: 2-27-25           Precautions: scoliosis        PTA Visit #: 0/5     Time In: 11:05  Time Out: 11:50  Total Billable Time:  45 minutes    SUBJECTIVE     Pt reports: feeling good tape off last night and no pain so far   The  coming tomorrow morning  Pt doing her HEP PT   Pt was compliant with home exercise program.  Response to previous treatment: felt ok  Functional change: can sleep better with new pillow doing all ADL and no heavy lifting     Pain: 0/10 in  upper trap tightness  no scapula pain at end of session decreased tightness of upper trap no pain    Location: right side of neck into shoulder shooting pain to mid forearm R      MRI1.  There is degenerative change discussed in detail by level above.  There is no fracture.  These findings however are present in the setting of a spinal canal which may be borderline small on a developmental basis.  The most significant abnormality is present at the C5-6 level where there is a right paracentral disc extrusion with cephalad extension contributing to flattening of the right ventral cord surface, severe right lateral recess and proximal foraminal stenosis, moderate spinal stenosis.  Cord signal is grossly  "normal.  The study however is motion degraded which does limit evaluation of cord signal.     2.  At the C6-7 level there is a broad left paracentral disc protrusion which flattens the left side of the cord and contributes to mild spinal stenosis and crowding of the left lateral recess.     3.  At the C4-5 level there is a broad right paracentral disc protrusion which flattens the right ventral cord surface and contributes to mild spinal canal and foraminal stenosis.       OBJECTIVE     R 45# L 50#  1-10-25    Mod-severe tightness upper trap with localized trigger point min-0 in levator  min in suboccipital, min levator scapulae improved after manual therapy and US      Treatment     Kimberly received the treatments listed below:      Therapeutic exercises were performed to improve ROM, strength, flexibility and stabilization for 12 minutes.       Start with chin tuck and neck flexion perform all ex and scap stab and then end with chin tuck and neck flexion last continue q 2 hours flexion principles or at onset of increased symptoms   Cervical rotation x 10    Pt reports working with theraputty for      Bicep curl x 4# x 3/10  Tricep with GTB 2/10 and pulley system at home  Chest press x 2/10 5#         neuromuscular re-education activities to improve: posture and to decrease discogenic signs and symptoms for 13 minutes. The following activities were included:  Instructed pt further in proper posture and need to work on   Chin tuck x 10   Neck flexion x 10     Neck extension x 5  Then chin tuck and neck flexion again x 5       Protraction x 20 supine with 5#     Pulldown with retraction with YTB x 20  Retraction with YTB x 20   ER with YTB x 20B  IR with YTB x 20   Provided pt YTB for home use      HOLDKinesiotape with 4" star for pain relief was performed over the levator scapulae insertion on scapula R . Added 8" upper trap inhibition tape and will note response  Instructed pt in use, care and precautions " with tape.    Skin a little red from KT tape some tape residue removed with adhesive remover for pt     [facility] shoulder taping #1,2,3 was performed to provide support to scapula and shoulder to decrease pain and improve functional use of UE.  Instructed pt in use, care and precautions with tape.       manual therapy techniques: soft tissue mobilization  were applied to the: neck and upper trap and levator scapulae and suboccipital for 10 direct minutes, including:  Gentle side glide and P/A mob Grade 1-2   STM and suboccipital release        Ultrasound  for pain control and to decrease inflammation @ continuous duty cycle, 1 Mhz, applied to R upper trap , intensity = 1.7 w/cm2 for 8 minutes.      (NP)therapeutic activities to improve functional performance for 0  minutes, including:    Instructed pt in role of levator scapulae with FH position and need to do more unloading   [place] cards but probably wrapping to keep to minimum     Reviewed sleep position and pt to borrow sister's pillow to try also Pt with another pillow that is better than previous pillow  Instructed pt further in unloading and to continue with q 2 hour ex or at onset of symptoms     Patient Education and Home Exercises     Home Exercises Provided and Patient Education Provided     Education provided:   - flexion/extension principles  - HEP   - stretch to point of tightness not pain   - exercise in pain free zone       Written Home Exercises Provided: continue prior HEP . Exercises were reviewed and Kimberly was able to demonstrate them prior to the end of the session.  Kimberly demonstrated good understanding of the education provided. See EMR under Patient Instructions for exercises provided during therapy sessions    ASSESSMENT   Pt doing well without tape with no symptoms and will hold until Thurs Pt understands to call if problem arises prior to Thurs.  Mod-severe tightness upper trap with focus on trigger point responded well to manual  therapy and US  Pt tolerated treatment well and able to progress with strengthening and stabilization without adverse effect     Kimberly Is progressing well towards her goals.     Pt prognosis is Good.   Rehab potential:  Good    Pt will continue to benefit from skilled outpatient physical therapy to address the goals listed in the initial evaluation, provide pt/family education and to maximize pt's level of independence in the home and community environment.     Pt's spiritual, cultural and educational needs considered and pt agreeable to plan of care and goals.     Anticipated barriers to physical therapy: none    GOALS:   Short Term Goals:  4 weeks MET STG's  Increase range of motion 25%  Increase strength 1/2 muscle grade  Increase postural awareness to normal  Be able to perform HEP with minimal cueing required     Long Term Goals: 8 weeks (Progressing, not met)  Increase range of motion to 75%to 100% of full  Increase strength 1 muscle grade  Improve scapular stabilization to normal  Restore normal sleep habits without disturbances due to pain  Restore ability to drive without increased pain  Restore ability to participate in an exercise program for Wellness   Restore ability to perform ADL's and household activities independently and without increased pain  Pt to be independent with HEP to improve ROM and independence with ADL's    PLAN   Continue with established plan of care towards PT goals.      Maintain flexion principles  Consider cervical traction as needed  assess response to no tape    Melonie Oconnor, PT

## 2025-01-14 ENCOUNTER — CLINICAL SUPPORT (OUTPATIENT)
Dept: REHABILITATION | Facility: HOSPITAL | Age: 45
End: 2025-01-14
Payer: COMMERCIAL

## 2025-01-14 DIAGNOSIS — M54.12 CERVICAL RADICULOPATHY: ICD-10-CM

## 2025-01-14 DIAGNOSIS — M54.2 NECK PAIN: Primary | ICD-10-CM

## 2025-01-14 DIAGNOSIS — M62.81 MUSCLE WEAKNESS: ICD-10-CM

## 2025-01-14 DIAGNOSIS — M79.601 DIFFUSE PAIN IN RIGHT UPPER EXTREMITY: ICD-10-CM

## 2025-01-14 PROCEDURE — 97035 APP MDLTY 1+ULTRASOUND EA 15: CPT | Mod: PN | Performed by: PHYSICAL THERAPIST

## 2025-01-14 PROCEDURE — 97112 NEUROMUSCULAR REEDUCATION: CPT | Mod: PN | Performed by: PHYSICAL THERAPIST

## 2025-01-14 PROCEDURE — 97110 THERAPEUTIC EXERCISES: CPT | Mod: PN | Performed by: PHYSICAL THERAPIST

## 2025-01-14 PROCEDURE — 97140 MANUAL THERAPY 1/> REGIONS: CPT | Mod: PN | Performed by: PHYSICAL THERAPIST

## 2025-01-15 NOTE — PROGRESS NOTES
[facility]   Physical Therapy Treatment Note     Name: Kimberly Pearce  Clinic Number: [id number]    Therapy Diagnosis:   Encounter Diagnoses   Name Primary?    Neck pain Yes    Cervical radiculopathy     Diffuse pain in right upper extremity     Muscle weakness      Physician: Marina Whitfield PA-C    Visit Date: 1/16/2025    Physician Orders: PT Eval and Treat   Medical Diagnosis from Referral: M54.12 (ICD-10-CM) - Cervical radiculopathy   Evaluation Date: 12/3/2024  Authorization Period Expiration: 12-31-24  Plan of Care Expiration: 1-28-25  Progress Note Due: 1-28-25  Visit # / Visits authorized: 12/28     FOTO goal 66 11 visits  Foto  Date  Score    #1/3 12/3/2024 46   #2/3       #3/3             MD Follow up appointment: 2-27-25           Precautions: scoliosis        PTA Visit #: 0/5     Time In: 10:05  Time Out: 11:00  Total Billable Time:  55 minutes    SUBJECTIVE     Pt reports: feeling a little tight in levator scapulae but overall did well and with  but did take it very easy and mild   Pt was compliant with home exercise program.  Response to previous treatment: felt ok  Functional change: can sleep better with new pillow doing all ADL and no heavy lifting     Pain: 0-1/10 in  upper trap tightness  no scapula pain at end of session decreased tightness and no pain    Location: right side of neck into shoulder shooting pain to mid forearm R      MRI1.  There is degenerative change discussed in detail by level above.  There is no fracture.  These findings however are present in the setting of a spinal canal which may be borderline small on a developmental basis.  The most significant abnormality is present at the C5-6 level where there is a right paracentral disc extrusion with cephalad extension contributing to flattening of the right ventral cord surface, severe right lateral recess and proximal foraminal stenosis, moderate spinal stenosis.  Cord signal is grossly normal.   "The study however is motion degraded which does limit evaluation of cord signal.     2.  At the C6-7 level there is a broad left paracentral disc protrusion which flattens the left side of the cord and contributes to mild spinal stenosis and crowding of the left lateral recess.     3.  At the C4-5 level there is a broad right paracentral disc protrusion which flattens the right ventral cord surface and contributes to mild spinal canal and foraminal stenosis.       OBJECTIVE     R 45# L 50#  1-10-25    Mod-severe tightness upper trap with localized trigger point min-0 in levator  min in suboccipital, min levator scapulae improved after manual therapy and US      Treatment     Kimberly received the treatments listed below:      Therapeutic exercises were performed to improve ROM, strength, flexibility and stabilization for 15 minutes.       Start with chin tuck and neck flexion perform all ex and scap stab and then end with chin tuck and neck flexion last continue q 2 hours flexion principles or at onset of increased symptoms   Cervical rotation x 10    Pt reports working with theraputty for      Bicep curl x 5# x    Tricep kick pack 5# 2/15  Chest press x 2/10 5#         neuromuscular re-education activities to improve: posture and to decrease discogenic signs and symptoms for 15 minutes. The following activities were included:  Instructed pt further in proper posture and need to work on   Chin tuck x 10   Neck flexion x 10     Neck extension x 5  Then chin tuck and neck flexion again x 5       Protraction x 30 supine with 5#     Pulldown with retraction with YTB x 25  Retraction with YTB x 20   ER with YTB x 22 R 20 L  IR with YTB x 30 B   Provided pt YTB for home use      Kinesiotape with 4" star for pain relief was performed over the levator scapulae insertion on scapula R .  8" upper trap inhibition tape Instructed pt in use, care and precautions with tape.    Skin a little red from KT tape some tape " residue removed with adhesive remover for pt     [facility] shoulder taping #1,2,3 was performed to provide support to scapula and shoulder to decrease pain and improve functional use of UE.  Instructed pt in use, care and precautions with tape.       manual therapy techniques: soft tissue mobilization  were applied to the: neck and upper trap and levator scapulae and suboccipital for 15  minutes, including:  Gentle side glide and P/A mob Grade 1-2   STM and suboccipital release        Ultrasound  for pain control and to decrease inflammation @ continuous duty cycle, 1 Mhz, applied to R upper trap , intensity = 1.7 w/cm2 for 8 minutes.      (NP)therapeutic activities to improve functional performance for 0  minutes, including:    Instructed pt in role of levator scapulae with FH position and need to do more unloading   Mobile cards but probably wrapping to keep to minimum     Reviewed sleep position and pt to borrow sister's pillow to try also Pt with another pillow that is better than previous pillow  Instructed pt further in unloading and to continue with q 2 hour ex or at onset of symptoms     Patient Education and Home Exercises     Home Exercises Provided and Patient Education Provided     Education provided:   - flexion/extension principles  - HEP   - stretch to point of tightness not pain   - exercise in pain free zone       Written Home Exercises Provided: continue prior HEP . Exercises were reviewed and Kimberly was able to demonstrate them prior to the end of the session.  Kimberly demonstrated good understanding of the education provided. See EMR under Patient Instructions for exercises provided during therapy sessions    ASSESSMENT   Pt with some symptoms in levator scapulae today but may be related to resuming work out with   Did restore tape and will work with  again Mon and note response.  Pt tolerated treatment well and able to progress with strengthening and stabilization without  adverse effect       Kimberly Is progressing well towards her goals.     Pt prognosis is Good.   Rehab potential:  Good    Pt will continue to benefit from skilled outpatient physical therapy to address the goals listed in the initial evaluation, provide pt/family education and to maximize pt's level of independence in the home and community environment.     Pt's spiritual, cultural and educational needs considered and pt agreeable to plan of care and goals.     Anticipated barriers to physical therapy: none    GOALS:   Short Term Goals:  4 weeks MET STG's  Increase range of motion 25%  Increase strength 1/2 muscle grade  Increase postural awareness to normal  Be able to perform HEP with minimal cueing required     Long Term Goals: 8 weeks (Progressing, not met)  Increase range of motion to 75%to 100% of full  Increase strength 1 muscle grade  Improve scapular stabilization to normal  Restore normal sleep habits without disturbances due to pain  Restore ability to drive without increased pain  Restore ability to participate in an exercise program for Wellness   Restore ability to perform ADL's and household activities independently and without increased pain  Pt to be independent with HEP to improve ROM and independence with ADL's    PLAN   Continue with established plan of care towards PT goals.      Maintain flexion principles  Consider cervical traction as needed     Melonie Oconnor, PT

## 2025-01-16 ENCOUNTER — CLINICAL SUPPORT (OUTPATIENT)
Dept: REHABILITATION | Facility: HOSPITAL | Age: 45
End: 2025-01-16
Payer: COMMERCIAL

## 2025-01-16 DIAGNOSIS — M79.601 DIFFUSE PAIN IN RIGHT UPPER EXTREMITY: ICD-10-CM

## 2025-01-16 DIAGNOSIS — M54.2 NECK PAIN: Primary | ICD-10-CM

## 2025-01-16 DIAGNOSIS — M62.81 MUSCLE WEAKNESS: ICD-10-CM

## 2025-01-16 DIAGNOSIS — M54.12 CERVICAL RADICULOPATHY: ICD-10-CM

## 2025-01-16 PROCEDURE — 97140 MANUAL THERAPY 1/> REGIONS: CPT | Mod: PN | Performed by: PHYSICAL THERAPIST

## 2025-01-16 PROCEDURE — 97110 THERAPEUTIC EXERCISES: CPT | Mod: PN | Performed by: PHYSICAL THERAPIST

## 2025-01-16 PROCEDURE — 97112 NEUROMUSCULAR REEDUCATION: CPT | Mod: PN | Performed by: PHYSICAL THERAPIST

## 2025-01-16 PROCEDURE — 97035 APP MDLTY 1+ULTRASOUND EA 15: CPT | Mod: PN | Performed by: PHYSICAL THERAPIST

## 2025-01-23 NOTE — PROGRESS NOTES
[facility]   Physical Therapy Discharge and Treatment Note     Name: Kimberly Pearce  Clinic Number: [id number]    Therapy Diagnosis:   Encounter Diagnoses   Name Primary?    Neck pain Yes    Cervical radiculopathy     Diffuse pain in right upper extremity     Muscle weakness        Physician: Marina Whitfield PA-C    Visit Date: 1/27/2025    Physician Orders: PT Eval and Treat   Medical Diagnosis from Referral: M54.12 (ICD-10-CM) - Cervical radiculopathy   Evaluation Date: 12/3/2024  Authorization Period Expiration: 12-31-24  Plan of Care Expiration: 1-28-25  Progress Note Due: 1-28-25  Visit # / Visits authorized: 13/28     FOTO goal 66 11 visits  Foto  Date  Score    #1/3 12/3/2024 46   #2/3       #3/3             MD Follow up appointment: 2-27-25           Precautions: scoliosis        PTA Visit #: 0/5     Time In: 11:13  Time Out: 11:42    Total Billable Time:  29 minutes    SUBJECTIVE     Pt reports: overall doing very well and no pain in last week Pt states is without tape and feeling fine No pain and doing everything   Pt was compliant with home exercise program.  Response to previous treatment: felt ok  Functional change: can sleep better with new pillow doing all ADL and no heavy lifting     Pain: 0/10     Location: right side of neck into shoulder shooting pain to mid forearm R      MRI1.  There is degenerative change discussed in detail by level above.  There is no fracture.  These findings however are present in the setting of a spinal canal which may be borderline small on a developmental basis.  The most significant abnormality is present at the C5-6 level where there is a right paracentral disc extrusion with cephalad extension contributing to flattening of the right ventral cord surface, severe right lateral recess and proximal foraminal stenosis, moderate spinal stenosis.  Cord signal is grossly normal.  The study however is motion degraded which does limit evaluation of  cord signal.     2.  At the C6-7 level there is a broad left paracentral disc protrusion which flattens the left side of the cord and contributes to mild spinal stenosis and crowding of the left lateral recess.     3.  At the C4-5 level there is a broad right paracentral disc protrusion which flattens the right ventral cord surface and contributes to mild spinal canal and foraminal stenosis.       OBJECTIVE     R 45# L 60#   1-27-25 at initial measurement 25# R  45# L     In sitting  strength 30# R 45 L at last visit 25# R  45# L   Pt is R handed  Cervical rotation 70 B     Cervical ROM: (measured in degrees sitting) 1-27-25  - flexion -  45 feels normal                 - extension -  55 no pain                         - right side bending -  40        - left side bending -  45       - right rotation - 70  - left rotation - 70        Cervical ROM: (measured in degrees sitting) initial eval  - flexion -  45  stretching in neck                   - extension -  55   pain down to elbow                     - right side bending -  40        - left side bending -  45      - right rotation - 70 stretch neck  - left rotation - 70 stretch neck      Shoulder ROM: (measured in degrees standing) same as IE   Shoulder  RUE LUE   Active Flexion 170 170   Active Abduction 170 170      Slight instability noted at IE Scapular instability noted with elevation and abduction in standing with increased winging, lateral rotation and protraction of the R scapula.       Shoulder ROM: WNL     Muscle Strength 1-27-25  MMT R L   Elbow flexion 5-/5 5/5   Elbow extension 5-/5 5/5   Shoulder flexion 5-/5 5/5   Shoulder abduction 5-/5 5/5   Shoulder external rotation 4+/5 5-/5   Shoulder internal rotation 5-/5 5/5         Muscle Strength initial eval   MMT R L   Elbow flexion 4+/5 5/5   Elbow extension 4+/5 5/5   Shoulder flexion 4+/5 5/5   Shoulder abduction 4+/5 5/5   Shoulder external rotation 4/5 4+/5   Shoulder internal rotation 4+/5  "5/5           Upper trap 5/5 5/5      MMT R L   Cervical flexion 5/5 5/5   Cervical extension 5/5 5/5   Cervical sidebending 5/5 5/5      Endurance is not tested     Special tests: - alar and - compression  + response to flexion principles and able to progress to extension today without radicular symptoms      Palpation: mod tightness and TTP cervical paraspinals and suboccipitals     Joint mobility: N/T at IE difficulty relaxing to fully assess cervical mobility        Intake Outcome Measure for FOTO neck Survey     Therapist reviewed FOTO scores for Kimberly Pearce   FOTO documents entered into [employer] - see Media section or FOTO account episode details.     Intake Score: will perform at future session due to continued flexion restrictions with ADL at IE 46%          Treatment     Kimberly received the treatments listed below:      Therapeutic exercises were performed to improve ROM, strength, flexibility and stabilization for 21 minutes.     Reassessment   Start with chin tuck and neck flexion perform all ex and scap stab and then end with chin tuck and neck flexion last continue q 2 hours flexion principles or at onset of increased symptoms   Cervical rotation x 10    Pt reports working with theraputty for      Bicep curl x 5# x    Tricep kick pack 5# 2/15  Chest press x 2/10 5#         neuromuscular re-education activities to improve: posture and to decrease discogenic signs and symptoms for 8 minutes. The following activities were included:  Instructed pt further in proper posture and need to work on   Chin tuck x 10   Neck flexion x 10     Neck extension x 5  Then chin tuck and neck flexion again x 5       Protraction x 30 supine with 5#     [place] with retraction with YTB x 25  Retraction with YTB x 20   ER with YTB x 22 R 20 L  IR with YTB x 30 B         (NP)Kinesiotape with 4" star for pain relief was performed over the levator scapulae insertion on scapula R .  8" upper trap inhibition tape Instructed pt " in use, care and precautions with tape.    Skin a little red from KT tape some tape residue removed with adhesive remover for pt     [facility] shoulder taping #1,2,3 was performed to provide support to scapula and shoulder to decrease pain and improve functional use of UE.  Instructed pt in use, care and precautions with tape.       (NP)manual therapy techniques: soft tissue mobilization  were applied to the: neck and upper trap and levator scapulae and suboccipital for  0 minutes, including:  Gentle side glide and P/A mob Grade 1-2   STM and suboccipital release         (NP) Ultrasound  for pain control and to decrease inflammation @ continuous duty cycle, 1 Mhz, applied to R upper trap , intensity = 1.7 w/cm2 for 8 minutes.      (NP)therapeutic activities to improve functional performance for 0  minutes, including:    Instructed pt in role of levator scapulae with FH position and need to do more unloading   [place] cards but probably wrapping to keep to minimum     Reviewed sleep position and pt to borrow sister's pillow to try also Pt with another pillow that is better than previous pillow  Instructed pt further in unloading and to continue with q 2 hour ex or at onset of symptoms     Patient Education and Home Exercises     Home Exercises Provided and Patient Education Provided     Education provided:   - flexion/extension principles  - HEP   - stretch to point of tightness not pain   - exercise in pain free zone       Written Home Exercises Provided: continue prior HEP . Exercises were reviewed and Kimberly was able to demonstrate them prior to the end of the session.  Kimberly demonstrated good understanding of the education provided. See EMR under Patient Instructions for exercises provided during therapy sessions    ASSESSMENT   Patient demonstrates improvement in range of motion, strength, stabilization and function.    Patient appears independent in the prescribed HEP and ready for discharge after fully  achieving the established goals.        GOALS:   Short Term Goals:  4 weeks MET STG's  Increase range of motion 25%  Increase strength 1/2 muscle grade  Increase postural awareness to normal  Be able to perform HEP with minimal cueing required     Long Term Goals: 8 weeks MET LTG's  Increase range of motion to 75%to 100% of full  Increase strength 1 muscle grade  Improve scapular stabilization to normal  Restore normal sleep habits without disturbances due to pain  Restore ability to drive without increased pain  Restore ability to participate in an exercise program for Wellness   Restore ability to perform ADL's and household activities independently and without increased pain  Pt to be independent with HEP to improve ROM and independence with ADL's    PLAN   Patient is discharged from physical therapy after fully achieving the established goals.  Thank you for allowing us to assist in the care of your patient.      Mleonie Oconnor, PT

## 2025-01-27 ENCOUNTER — CLINICAL SUPPORT (OUTPATIENT)
Dept: REHABILITATION | Facility: HOSPITAL | Age: 45
End: 2025-01-27
Attending: PHYSICAL THERAPIST
Payer: COMMERCIAL

## 2025-01-27 DIAGNOSIS — M62.81 MUSCLE WEAKNESS: ICD-10-CM

## 2025-01-27 DIAGNOSIS — M54.12 CERVICAL RADICULOPATHY: ICD-10-CM

## 2025-01-27 DIAGNOSIS — M54.2 NECK PAIN: Primary | ICD-10-CM

## 2025-01-27 DIAGNOSIS — M79.601 DIFFUSE PAIN IN RIGHT UPPER EXTREMITY: ICD-10-CM

## 2025-01-27 PROCEDURE — 97112 NEUROMUSCULAR REEDUCATION: CPT | Mod: PN | Performed by: PHYSICAL THERAPIST

## 2025-01-27 PROCEDURE — 97110 THERAPEUTIC EXERCISES: CPT | Mod: PN | Performed by: PHYSICAL THERAPIST

## 2025-03-19 ENCOUNTER — PATIENT MESSAGE (OUTPATIENT)
Dept: OBSTETRICS AND GYNECOLOGY | Facility: CLINIC | Age: 45
End: 2025-03-19
Payer: COMMERCIAL